# Patient Record
Sex: FEMALE | Race: WHITE | NOT HISPANIC OR LATINO | Employment: OTHER | URBAN - METROPOLITAN AREA
[De-identification: names, ages, dates, MRNs, and addresses within clinical notes are randomized per-mention and may not be internally consistent; named-entity substitution may affect disease eponyms.]

---

## 2017-04-20 ENCOUNTER — TRANSCRIBE ORDERS (OUTPATIENT)
Dept: ADMINISTRATIVE | Facility: HOSPITAL | Age: 62
End: 2017-04-20

## 2017-04-20 DIAGNOSIS — Z12.31 VISIT FOR SCREENING MAMMOGRAM: Primary | ICD-10-CM

## 2017-04-26 ENCOUNTER — TRANSCRIBE ORDERS (OUTPATIENT)
Dept: ADMINISTRATIVE | Facility: HOSPITAL | Age: 62
End: 2017-04-26

## 2017-04-26 DIAGNOSIS — Z87.891 PERSONAL HISTORY OF TOBACCO USE, PRESENTING HAZARDS TO HEALTH: Primary | ICD-10-CM

## 2017-05-01 ENCOUNTER — HOSPITAL ENCOUNTER (OUTPATIENT)
Dept: CT IMAGING | Facility: HOSPITAL | Age: 62
Discharge: HOME | End: 2017-05-01
Admitting: NURSE PRACTITIONER

## 2017-05-01 DIAGNOSIS — Z87.891 PERSONAL HISTORY OF TOBACCO USE, PRESENTING HAZARDS TO HEALTH: ICD-10-CM

## 2017-05-01 PROCEDURE — G0297 LDCT FOR LUNG CA SCREEN: HCPCS

## 2017-05-17 ENCOUNTER — HOSPITAL ENCOUNTER (OUTPATIENT)
Dept: MAMMOGRAPHY | Facility: HOSPITAL | Age: 62
Discharge: HOME | End: 2017-05-17
Attending: FAMILY MEDICINE | Admitting: FAMILY MEDICINE

## 2017-05-17 DIAGNOSIS — Z12.31 VISIT FOR SCREENING MAMMOGRAM: ICD-10-CM

## 2017-05-17 PROCEDURE — G0202 SCR MAMMO BI INCL CAD: HCPCS | Performed by: RADIOLOGY

## 2017-05-17 PROCEDURE — 77063 BREAST TOMOSYNTHESIS BI: CPT | Performed by: RADIOLOGY

## 2017-05-17 PROCEDURE — 77063 BREAST TOMOSYNTHESIS BI: CPT

## 2017-05-17 PROCEDURE — G0202 SCR MAMMO BI INCL CAD: HCPCS

## 2017-05-25 ENCOUNTER — APPOINTMENT (OUTPATIENT)
Dept: MAMMOGRAPHY | Facility: HOSPITAL | Age: 62
End: 2017-05-25

## 2017-05-30 ENCOUNTER — HOSPITAL ENCOUNTER (OUTPATIENT)
Dept: MAMMOGRAPHY | Facility: HOSPITAL | Age: 62
Discharge: HOME | End: 2017-05-30
Admitting: FAMILY MEDICINE

## 2017-05-30 DIAGNOSIS — R92.8 ABNORMAL MAMMOGRAM: ICD-10-CM

## 2017-05-30 PROCEDURE — G0279 TOMOSYNTHESIS, MAMMO: HCPCS

## 2017-05-30 PROCEDURE — G0206 DX MAMMO INCL CAD UNI: HCPCS

## 2017-05-30 PROCEDURE — G0206 DX MAMMO INCL CAD UNI: HCPCS | Performed by: RADIOLOGY

## 2017-05-30 PROCEDURE — G0279 TOMOSYNTHESIS, MAMMO: HCPCS | Performed by: RADIOLOGY

## 2018-04-24 ENCOUNTER — TRANSCRIBE ORDERS (OUTPATIENT)
Dept: ADMINISTRATIVE | Facility: HOSPITAL | Age: 63
End: 2018-04-24

## 2018-04-24 DIAGNOSIS — Z12.31 VISIT FOR SCREENING MAMMOGRAM: Primary | ICD-10-CM

## 2018-06-06 ENCOUNTER — HOSPITAL ENCOUNTER (OUTPATIENT)
Dept: MAMMOGRAPHY | Facility: HOSPITAL | Age: 63
Discharge: HOME | End: 2018-06-06
Attending: FAMILY MEDICINE | Admitting: FAMILY MEDICINE

## 2018-06-06 DIAGNOSIS — Z12.31 VISIT FOR SCREENING MAMMOGRAM: ICD-10-CM

## 2018-06-06 PROCEDURE — 77067 SCR MAMMO BI INCL CAD: CPT | Performed by: RADIOLOGY

## 2018-06-06 PROCEDURE — 77067 SCR MAMMO BI INCL CAD: CPT

## 2018-06-06 PROCEDURE — 77063 BREAST TOMOSYNTHESIS BI: CPT | Performed by: RADIOLOGY

## 2018-06-06 PROCEDURE — 77063 BREAST TOMOSYNTHESIS BI: CPT

## 2019-05-02 ENCOUNTER — TRANSCRIBE ORDERS (OUTPATIENT)
Dept: ADMINISTRATIVE | Facility: HOSPITAL | Age: 64
End: 2019-05-02

## 2019-05-02 DIAGNOSIS — Z87.891 PERSONAL HISTORY OF TOBACCO USE, PRESENTING HAZARDS TO HEALTH: Primary | ICD-10-CM

## 2019-05-13 ENCOUNTER — HOSPITAL ENCOUNTER (OUTPATIENT)
Dept: CT IMAGING | Facility: HOSPITAL | Age: 64
Discharge: HOME | End: 2019-05-13
Admitting: NURSE PRACTITIONER

## 2019-05-13 DIAGNOSIS — Z87.891 PERSONAL HISTORY OF TOBACCO USE, PRESENTING HAZARDS TO HEALTH: ICD-10-CM

## 2019-05-13 PROCEDURE — G0297 LDCT FOR LUNG CA SCREEN: HCPCS

## 2019-05-30 ENCOUNTER — TRANSCRIBE ORDERS (OUTPATIENT)
Dept: ADMINISTRATIVE | Facility: HOSPITAL | Age: 64
End: 2019-05-30

## 2019-05-30 DIAGNOSIS — Z12.31 VISIT FOR SCREENING MAMMOGRAM: Primary | ICD-10-CM

## 2019-07-08 ENCOUNTER — APPOINTMENT (OUTPATIENT)
Dept: MAMMOGRAPHY | Facility: HOSPITAL | Age: 64
End: 2019-07-08

## 2019-08-15 ENCOUNTER — HOSPITAL ENCOUNTER (OUTPATIENT)
Dept: MAMMOGRAPHY | Facility: HOSPITAL | Age: 64
Discharge: HOME | End: 2019-08-15
Admitting: FAMILY MEDICINE

## 2019-08-15 DIAGNOSIS — Z12.31 VISIT FOR SCREENING MAMMOGRAM: ICD-10-CM

## 2019-08-15 PROCEDURE — 77067 SCR MAMMO BI INCL CAD: CPT

## 2019-08-15 PROCEDURE — 77067 SCR MAMMO BI INCL CAD: CPT | Performed by: RADIOLOGY

## 2019-08-15 PROCEDURE — 77063 BREAST TOMOSYNTHESIS BI: CPT | Performed by: RADIOLOGY

## 2019-08-15 PROCEDURE — 77063 BREAST TOMOSYNTHESIS BI: CPT

## 2020-08-14 ENCOUNTER — TRANSCRIBE ORDERS (OUTPATIENT)
Dept: ADMINISTRATIVE | Facility: HOSPITAL | Age: 65
End: 2020-08-14

## 2020-08-14 DIAGNOSIS — Z12.31 VISIT FOR SCREENING MAMMOGRAM: Primary | ICD-10-CM

## 2020-10-10 ENCOUNTER — HOSPITAL ENCOUNTER (OUTPATIENT)
Dept: MAMMOGRAPHY | Facility: HOSPITAL | Age: 65
Discharge: HOME | End: 2020-10-10
Admitting: FAMILY MEDICINE

## 2020-10-10 DIAGNOSIS — Z12.31 VISIT FOR SCREENING MAMMOGRAM: ICD-10-CM

## 2020-10-10 PROCEDURE — 77067 SCR MAMMO BI INCL CAD: CPT | Performed by: RADIOLOGY

## 2020-10-10 PROCEDURE — 77063 BREAST TOMOSYNTHESIS BI: CPT | Performed by: RADIOLOGY

## 2020-10-10 PROCEDURE — 77063 BREAST TOMOSYNTHESIS BI: CPT

## 2020-10-10 PROCEDURE — 77067 SCR MAMMO BI INCL CAD: CPT

## 2021-02-02 ENCOUNTER — TRANSCRIBE ORDERS (OUTPATIENT)
Dept: ADMINISTRATIVE | Facility: HOSPITAL | Age: 66
End: 2021-02-02

## 2021-02-02 DIAGNOSIS — Z87.891 PERSONAL HISTORY OF TOBACCO USE, PRESENTING HAZARDS TO HEALTH: Primary | ICD-10-CM

## 2021-02-18 ENCOUNTER — APPOINTMENT (OUTPATIENT)
Dept: CT IMAGING | Facility: HOSPITAL | Age: 66
End: 2021-02-18

## 2021-02-25 ENCOUNTER — HOSPITAL ENCOUNTER (OUTPATIENT)
Dept: CT IMAGING | Facility: HOSPITAL | Age: 66
Discharge: HOME | End: 2021-02-25
Admitting: NURSE PRACTITIONER

## 2021-02-25 DIAGNOSIS — Z87.891 PERSONAL HISTORY OF TOBACCO USE, PRESENTING HAZARDS TO HEALTH: ICD-10-CM

## 2021-02-25 PROCEDURE — 71271 CT THORAX LUNG CANCER SCR C-: CPT

## 2021-04-13 ENCOUNTER — OFFICE VISIT (OUTPATIENT)
Dept: PULMONOLOGY | Facility: CLINIC | Age: 66
End: 2021-04-13

## 2021-04-13 VITALS
SYSTOLIC BLOOD PRESSURE: 136 MMHG | HEART RATE: 74 BPM | TEMPERATURE: 98.2 F | OXYGEN SATURATION: 92 % | WEIGHT: 130.4 LBS | DIASTOLIC BLOOD PRESSURE: 84 MMHG | BODY MASS INDEX: 23.11 KG/M2 | HEIGHT: 63 IN

## 2021-04-13 DIAGNOSIS — Z72.0 TOBACCO ABUSE: ICD-10-CM

## 2021-04-13 DIAGNOSIS — Z79.01 CHRONIC ANTICOAGULATION: ICD-10-CM

## 2021-04-13 DIAGNOSIS — R91.8 MULTIPLE PULMONARY NODULES: Primary | ICD-10-CM

## 2021-04-13 DIAGNOSIS — I05.9 MITRAL VALVE DISEASE: ICD-10-CM

## 2021-04-13 PROCEDURE — 99204 OFFICE O/P NEW MOD 45 MIN: CPT | Performed by: INTERNAL MEDICINE

## 2021-04-13 RX ORDER — NIFEDIPINE 30 MG/1
TABLET, EXTENDED RELEASE ORAL DAILY
COMMUNITY

## 2021-04-13 RX ORDER — ASPIRIN 81 MG/1
TABLET ORAL DAILY
COMMUNITY

## 2021-04-13 RX ORDER — RALOXIFENE HYDROCHLORIDE 60 MG/1
60 TABLET, FILM COATED ORAL DAILY
COMMUNITY

## 2021-04-13 RX ORDER — AMITRIPTYLINE HYDROCHLORIDE 25 MG/1
TABLET, FILM COATED ORAL DAILY
COMMUNITY
Start: 2013-05-07 | End: 2021-04-13

## 2021-04-13 RX ORDER — DICLOFENAC SODIUM 75 MG/1
TABLET, DELAYED RELEASE ORAL
COMMUNITY
Start: 2013-05-07

## 2021-04-13 RX ORDER — ALPRAZOLAM 0.5 MG/1
TABLET ORAL
COMMUNITY
Start: 2021-01-14

## 2021-04-13 RX ORDER — TRAZODONE HYDROCHLORIDE 100 MG/1
TABLET ORAL
COMMUNITY
Start: 2021-01-14

## 2021-04-13 RX ORDER — CLOPIDOGREL BISULFATE 75 MG/1
TABLET ORAL DAILY
COMMUNITY
Start: 2013-05-07

## 2021-04-13 RX ORDER — PANTOPRAZOLE SODIUM 40 MG/1
TABLET, DELAYED RELEASE ORAL DAILY
COMMUNITY

## 2021-04-13 RX ORDER — GABAPENTIN 300 MG/1
1 CAPSULE ORAL
COMMUNITY
Start: 2013-05-07

## 2021-04-13 RX ORDER — ATORVASTATIN CALCIUM 40 MG/1
TABLET, FILM COATED ORAL DAILY
COMMUNITY
Start: 2013-05-07

## 2021-04-13 RX ORDER — DULOXETIN HYDROCHLORIDE 60 MG/1
CAPSULE, DELAYED RELEASE ORAL DAILY
COMMUNITY
Start: 2013-05-07 | End: 2021-04-13

## 2021-04-13 RX ORDER — LISINOPRIL 40 MG/1
TABLET ORAL DAILY
COMMUNITY
Start: 2021-01-14

## 2021-04-13 RX ORDER — TRAMADOL HYDROCHLORIDE 50 MG/1
TABLET ORAL 3 TIMES DAILY
COMMUNITY
Start: 2013-05-07

## 2021-04-13 RX ORDER — METOPROLOL SUCCINATE 25 MG/1
TABLET, EXTENDED RELEASE ORAL DAILY
COMMUNITY
Start: 2013-05-07

## 2021-04-13 NOTE — PROGRESS NOTES
PULMONARY  NOTE    Chief Complaint     Abnormal CT scan of the chest, former smoker, valvular heart disease, chronic anticoagulation, chronic fluid retention    History of Present Illness     65-year-old female referred for evaluation of an abnormal CT scan of the chest    She has a history of tobacco abuse that has consisted of up to 1 pack of cigarettes per day  On one hand she says she stopped smoking in 2013  However, she indicates that she last smoked a cigarette about 4 months ago  She has been using nicotine vaping systems to help her stop smoking    She denies prior history of known lung disease  She does not cough on a regular basis and has never had hemoptysis  She does have some dyspnea on exertion but does not feel that it limits her on a daily basis  She has tried what sounds to have been albuterol in the past that did not seem to help    She has chronic lower extremity edema  She has been prescribed furosemide and potassium to take as needed for lower extremity edema  She admittedly is not good at taking her diuretics on a regular basis    She is undergone screening LDCT with the most recent LDCT as noted below hence her referral to our office    Patient Active Problem List   Diagnosis   • Tobacco abuse (Last cig ~12/2020)   • Bilateral solid and semisolid nodules (New RLL 6mm pleural based)   • Chronic anticoagulation (Warfarin)   • Mitral valve disease (S/P MVR)     Allergies   Allergen Reactions   • Vitamin K Confusion       Current Outpatient Medications:   •  ALPRAZolam (XANAX) 0.5 MG tablet, Take  by mouth., Disp: , Rfl:   •  aspirin (ASPIR) 81 MG EC tablet, Take  by mouth Daily., Disp: , Rfl:   •  atorvastatin (LIPITOR) 40 MG tablet, Take  by mouth Daily., Disp: , Rfl:   •  clopidogrel (Plavix) 75 MG tablet, Take  by mouth Daily., Disp: , Rfl:   •  diclofenac (VOLTAREN) 75 MG EC tablet, Take  by mouth., Disp: , Rfl:   •  gabapentin (NEURONTIN) 300 MG capsule, Take 1 capsule by mouth., Disp: ,  "Rfl:   •  lisinopril (PRINIVIL,ZESTRIL) 40 MG tablet, Take  by mouth Daily., Disp: , Rfl:   •  metoprolol succinate XL (TOPROL-XL) 25 MG 24 hr tablet, Take  by mouth Daily., Disp: , Rfl:   •  NIFEdipine XL (Procardia XL) 30 MG 24 hr tablet, Take  by mouth Daily., Disp: , Rfl:   •  pantoprazole (PROTONIX) 40 MG EC tablet, Take  by mouth Daily., Disp: , Rfl:   •  raloxifene (Evista) 60 MG tablet, Take 60 mg by mouth Daily., Disp: , Rfl:   •  traMADol (ULTRAM) 50 MG tablet, Take  by mouth 3 (Three) Times a Day., Disp: , Rfl:   •  traZODone (DESYREL) 100 MG tablet, Take  by mouth., Disp: , Rfl:   MEDICATION LIST AND ALLERGIES REVIEWED.    Family History   Problem Relation Age of Onset   • Breast cancer Mother 73   • Ovarian cancer Neg Hx      Social History     Tobacco Use   • Smoking status: Former Smoker     Packs/day: 1.00     Years: 35.00     Pack years: 35.00   • Smokeless tobacco: Never Used   • Tobacco comment: vapes   Substance Use Topics   • Alcohol use: Never   • Drug use: Never     Social History     Social History Narrative    Single    Retired    Last cigarette was about 4 months ago.    Has been using vaping to stop smoking     FAMILY AND SOCIAL HISTORY REVIEWED.    Review of Systems  ALSO REFER TO SCANNED ROS SHEET FROM SAME DATE.    /84   Pulse 74   Temp 98.2 °F (36.8 °C)   Ht 160 cm (63\")   Wt 59.1 kg (130 lb 6.4 oz)   LMP  (LMP Unknown)   SpO2 92% Comment: room  air  at rest  BMI 23.10 kg/m²   Physical Exam  Vitals and nursing note reviewed.   Constitutional:       General: She is not in acute distress.     Appearance: She is well-developed. She is not diaphoretic.   HENT:      Head: Normocephalic and atraumatic.   Neck:      Thyroid: No thyromegaly.   Cardiovascular:      Rate and Rhythm: Normal rate and regular rhythm.      Heart sounds: Normal heart sounds. No murmur heard.     Pulmonary:      Effort: Pulmonary effort is normal.      Breath sounds: Normal breath sounds. No stridor. "   Abdominal:      General: Bowel sounds are normal.      Palpations: Abdomen is soft.   Musculoskeletal:         General: Normal range of motion.      Right lower leg: No edema.      Left lower leg: No edema.   Lymphadenopathy:      Cervical: No cervical adenopathy.      Upper Body:      Right upper body: No supraclavicular or epitrochlear adenopathy.      Left upper body: No supraclavicular or epitrochlear adenopathy.   Skin:     General: Skin is warm and dry.   Neurological:      Mental Status: She is alert and oriented to person, place, and time.   Psychiatric:         Behavior: Behavior normal.         Results     CT scan of the chest done 2/25/2021 reviewed on PACS.  This is an LDCT  Bilateral subcentimeter pulmonary nodules both solid and semisolid  New right lower lobe pleural-based solid approximately 6 mm density    Immunization History   Administered Date(s) Administered   • COVID-19 (MODERNA) 02/23/2021, 03/24/2021   • Flu Vaccine Quad PF >36MO 10/09/2017, 10/24/2018, 10/09/2019, 10/13/2020   • Influenza Quad Vaccine (Inpatient) 11/16/2016     Problem List       ICD-10-CM ICD-9-CM   1. Bilateral solid and semisolid nodules (New RLL 6mm pleural based)  R91.8 793.19   2. Tobacco abuse (Last cig ~12/2020)  Z72.0 305.1   3. Chronic anticoagulation (Warfarin)  Z79.01 V58.61   4. Mitral valve disease (S/P MVR)  I05.9 394.9       Discussion     We reviewed her chest imaging  She has a new, solid, 6 mm pleural-based pulmonary nodule  This would be a lung RADS category 4A  I have recommended a 3-month CT scan of the chest and if stable would go to a 6-month CT scan of the chest after that.  This lesion is not large enough for PET scanning    I have encouraged her efforts at total smoking abstinence    Might consider PFTs in the future although at this point I do not think she is interested in being on any respiratory medicines for dyspnea    I have encouraged salt water avoidance and regular use of  diuretic    Moderate level of Medical Decision Making complexity based on 1 undiagnosed new problem and moderate amount and/or complexity of data review/analysis    Rodrick Figueroa MD  Note electronically signed    CC: Pedro Aguilera MD

## 2021-04-15 DIAGNOSIS — R91.8 MULTIPLE PULMONARY NODULES: Primary | ICD-10-CM

## 2021-06-17 ENCOUNTER — APPOINTMENT (OUTPATIENT)
Dept: CT IMAGING | Facility: HOSPITAL | Age: 66
End: 2021-06-17

## 2021-06-28 ENCOUNTER — HOSPITAL ENCOUNTER (OUTPATIENT)
Dept: CT IMAGING | Facility: HOSPITAL | Age: 66
Discharge: HOME | End: 2021-06-28
Admitting: NURSE PRACTITIONER

## 2021-06-28 DIAGNOSIS — R91.8 MULTIPLE PULMONARY NODULES: ICD-10-CM

## 2021-06-28 PROCEDURE — 71250 CT THORAX DX C-: CPT

## 2021-07-01 ENCOUNTER — OFFICE VISIT (OUTPATIENT)
Dept: PULMONOLOGY | Facility: CLINIC | Age: 66
End: 2021-07-01

## 2021-07-01 VITALS
OXYGEN SATURATION: 98 % | TEMPERATURE: 97.5 F | BODY MASS INDEX: 23.5 KG/M2 | DIASTOLIC BLOOD PRESSURE: 90 MMHG | WEIGHT: 132.6 LBS | SYSTOLIC BLOOD PRESSURE: 130 MMHG | HEART RATE: 76 BPM | HEIGHT: 63 IN

## 2021-07-01 DIAGNOSIS — Z72.0 TOBACCO ABUSE: ICD-10-CM

## 2021-07-01 DIAGNOSIS — R91.8 MULTIPLE PULMONARY NODULES: Primary | ICD-10-CM

## 2021-07-01 DIAGNOSIS — I05.9 MITRAL VALVE DISEASE: ICD-10-CM

## 2021-07-01 DIAGNOSIS — Z79.01 CHRONIC ANTICOAGULATION: ICD-10-CM

## 2021-07-01 PROCEDURE — 99214 OFFICE O/P EST MOD 30 MIN: CPT | Performed by: INTERNAL MEDICINE

## 2021-07-01 RX ORDER — WARFARIN SODIUM 5 MG/1
5 TABLET ORAL
COMMUNITY

## 2021-07-02 DIAGNOSIS — F17.210 CIGARETTE NICOTINE DEPENDENCE, UNCOMPLICATED: Primary | ICD-10-CM

## 2021-07-02 NOTE — PROGRESS NOTES
PULMONARY  NOTE    Chief Complaint     Pulmonary nodules, former smoker, valvular heart disease, chronic anticoagulation, chronic fluid retention    History of Present Illness     65-year-old female returns today for follow-up  I initially saw her 4/13/2021    She has a history of tobacco abuse that resolved in roughly December 2020  She has not been on regular maintenance therapy for COPD but has used albuterol in the past.    She has had chest imaging which on her prior scan revealed a new 6 mm right lower lobe pulmonary nodule.  Repeat CT scan of the chest is as noted below    She has a history of valvular heart disease and is chronically anticoagulated on warfarin  She has previously undergone MVR  She has had persistent issues with lower extremity edema and is currently on Bumex with no exacerbation of swelling    Patient Active Problem List   Diagnosis   • Tobacco abuse (Last cig ~12/2020)   • Bilateral solid and semisolid nodules   • Chronic anticoagulation (Warfarin)   • Mitral valve disease (S/P MVR)     Allergies   Allergen Reactions   • Vitamin K Confusion       Current Outpatient Medications:   •  ALPRAZolam (XANAX) 0.5 MG tablet, Take  by mouth., Disp: , Rfl:   •  aspirin (ASPIR) 81 MG EC tablet, Take  by mouth Daily., Disp: , Rfl:   •  atorvastatin (LIPITOR) 40 MG tablet, Take  by mouth Daily., Disp: , Rfl:   •  diclofenac (VOLTAREN) 75 MG EC tablet, Take  by mouth., Disp: , Rfl:   •  gabapentin (NEURONTIN) 300 MG capsule, Take 1 capsule by mouth., Disp: , Rfl:   •  lisinopril (PRINIVIL,ZESTRIL) 40 MG tablet, Take  by mouth Daily., Disp: , Rfl:   •  metoprolol succinate XL (TOPROL-XL) 25 MG 24 hr tablet, Take  by mouth Daily., Disp: , Rfl:   •  NIFEdipine XL (Procardia XL) 30 MG 24 hr tablet, Take  by mouth Daily., Disp: , Rfl:   •  pantoprazole (PROTONIX) 40 MG EC tablet, Take  by mouth Daily., Disp: , Rfl:   •  raloxifene (Evista) 60 MG tablet, Take 60 mg by mouth Daily., Disp: , Rfl:   •  traMADol  "(ULTRAM) 50 MG tablet, Take  by mouth 3 (Three) Times a Day., Disp: , Rfl:   •  traZODone (DESYREL) 100 MG tablet, Take  by mouth., Disp: , Rfl:   •  warfarin (COUMADIN) 5 MG tablet, Take 5 mg by mouth Daily., Disp: , Rfl:   •  clopidogrel (Plavix) 75 MG tablet, Take  by mouth Daily., Disp: , Rfl:   MEDICATION LIST AND ALLERGIES REVIEWED.    Family History   Problem Relation Age of Onset   • Breast cancer Mother 73   • Ovarian cancer Neg Hx      Social History     Tobacco Use   • Smoking status: Current Every Day Smoker     Packs/day: 1.00     Years: 35.00     Pack years: 35.00   • Smokeless tobacco: Never Used   • Tobacco comment: currently vapes as of 7/1/21   Substance Use Topics   • Alcohol use: Never   • Drug use: Never     Social History     Social History Narrative    Single    Retired    Last cigarette was about 4 months ago.    Has been using vaping to stop smoking     FAMILY AND SOCIAL HISTORY REVIEWED.    Review of Systems  ALSO REFER TO SCANNED ROS SHEET FROM SAME DATE.    /90   Pulse 76   Temp 97.5 °F (36.4 °C)   Ht 160 cm (63\")   Wt 60.1 kg (132 lb 9.6 oz)   LMP  (LMP Unknown)   SpO2 98% Comment: resting, room air  Breastfeeding No   BMI 23.49 kg/m²   Physical Exam  Vitals and nursing note reviewed.   Constitutional:       General: She is not in acute distress.     Appearance: She is well-developed. She is not diaphoretic.   HENT:      Head: Normocephalic and atraumatic.   Neck:      Thyroid: No thyromegaly.   Cardiovascular:      Rate and Rhythm: Normal rate and regular rhythm.      Heart sounds: Normal heart sounds. No murmur heard.     Pulmonary:      Effort: Pulmonary effort is normal.      Breath sounds: Normal breath sounds. No stridor.   Abdominal:      General: Bowel sounds are normal.      Palpations: Abdomen is soft.   Musculoskeletal:         General: Normal range of motion.   Lymphadenopathy:      Cervical: No cervical adenopathy.      Upper Body:      Right upper body: No " supraclavicular or epitrochlear adenopathy.      Left upper body: No supraclavicular or epitrochlear adenopathy.   Skin:     General: Skin is warm and dry.   Neurological:      Mental Status: She is alert and oriented to person, place, and time.   Psychiatric:         Behavior: Behavior normal.         Results     CT scan of the chest from 6/28/2021 reviewed on PACS.  Resolution of the previously noted 6 mm right lower lobe pulmonary nodule  Stability of other intrathoracic findings including her ascending aortic aneurysm which is about 4.6 cm in size  Immunization History   Administered Date(s) Administered   • COVID-19 (MODERNA) 02/23/2021, 03/24/2021   • Flu Vaccine Quad PF >36MO 10/09/2017, 10/24/2018, 10/09/2019, 10/13/2020   • Influenza Quad Vaccine (Inpatient) 11/16/2016     Problem List       ICD-10-CM ICD-9-CM   1. Bilateral solid and semisolid nodules (RLL 6mm pleural based)  R91.8 793.19   2. Chronic anticoagulation (Warfarin)  Z79.01 V58.61   3. Mitral valve disease (S/P MVR)  I05.9 394.9   4. Tobacco abuse (Last cig ~12/2020)  Z72.0 305.1       Discussion     We discussed her CT scan results.  At this point I would just recommend a repeat LD CT in 1 year in June 2022    She is can remain on Bumex with potassium for her lower extremity edema    She has not been on maintenance COPD therapy but has had albuterol in the past to use on an as-needed basis    I will plan to see her back next year or earlier if there are any problems in the meantime    I have encouraged her smoking cessation efforts    Moderate level of Medical Decision Making complexity based on 2 or more chronic stable illnesses and prescription drug management.    Rodrick Figueroa MD  Note electronically signed    CC: Pedro Aguilera MD

## 2022-01-18 ENCOUNTER — TRANSCRIBE ORDERS (OUTPATIENT)
Dept: ADMINISTRATIVE | Facility: HOSPITAL | Age: 67
End: 2022-01-18

## 2022-01-18 DIAGNOSIS — Z12.31 VISIT FOR SCREENING MAMMOGRAM: Primary | ICD-10-CM

## 2022-02-16 ENCOUNTER — HOSPITAL ENCOUNTER (OUTPATIENT)
Dept: MAMMOGRAPHY | Facility: HOSPITAL | Age: 67
Discharge: HOME | End: 2022-02-16
Admitting: FAMILY MEDICINE

## 2022-02-16 DIAGNOSIS — Z12.31 VISIT FOR SCREENING MAMMOGRAM: ICD-10-CM

## 2022-02-16 PROCEDURE — 77063 BREAST TOMOSYNTHESIS BI: CPT

## 2022-02-16 PROCEDURE — 77067 SCR MAMMO BI INCL CAD: CPT | Performed by: RADIOLOGY

## 2022-02-16 PROCEDURE — 77067 SCR MAMMO BI INCL CAD: CPT

## 2022-02-16 PROCEDURE — 77063 BREAST TOMOSYNTHESIS BI: CPT | Performed by: RADIOLOGY

## 2022-06-30 ENCOUNTER — HOSPITAL ENCOUNTER (OUTPATIENT)
Dept: CT IMAGING | Facility: HOSPITAL | Age: 67
Discharge: HOME | End: 2022-06-30
Admitting: NURSE PRACTITIONER

## 2022-06-30 DIAGNOSIS — F17.210 CIGARETTE NICOTINE DEPENDENCE, UNCOMPLICATED: ICD-10-CM

## 2022-06-30 PROCEDURE — 71271 CT THORAX LUNG CANCER SCR C-: CPT

## 2022-07-01 ENCOUNTER — OFFICE VISIT (OUTPATIENT)
Dept: PULMONOLOGY | Facility: CLINIC | Age: 67
End: 2022-07-01

## 2022-07-01 VITALS
DIASTOLIC BLOOD PRESSURE: 66 MMHG | BODY MASS INDEX: 23.92 KG/M2 | TEMPERATURE: 98.2 F | WEIGHT: 135 LBS | HEIGHT: 63 IN | HEART RATE: 62 BPM | OXYGEN SATURATION: 98 % | SYSTOLIC BLOOD PRESSURE: 122 MMHG

## 2022-07-01 DIAGNOSIS — Z72.0 TOBACCO ABUSE: ICD-10-CM

## 2022-07-01 DIAGNOSIS — Z79.01 CHRONIC ANTICOAGULATION: ICD-10-CM

## 2022-07-01 DIAGNOSIS — I05.9 MITRAL VALVE DISEASE: ICD-10-CM

## 2022-07-01 DIAGNOSIS — R91.8 MULTIPLE PULMONARY NODULES: Primary | ICD-10-CM

## 2022-07-01 PROCEDURE — 99214 OFFICE O/P EST MOD 30 MIN: CPT | Performed by: INTERNAL MEDICINE

## 2022-07-01 NOTE — PROGRESS NOTES
PULMONARY  NOTE    Chief Complaint     Pulmonary nodules, former smoker, valvular heart disease, chronic anticoagulation    History of Present Illness     66-year-old female returns today for follow-up  I last saw her 7/1/2021    Has a history of tobacco abuse, resolved in 2020  She remains off cigarettes although is using a Juul nicotine vaping system    She has had abnormal chest imaging with lower lobe pulmonary nodules  We have been pursuing serial chest imaging  Most recent CT scan of the chest is as noted below    She has a history of valvular heart disease with a prior MVR  She remains chronically anticoagulated on warfarin  She is had no bleeding issues    She has had problems in the past with persistent lower extremity edema although appears somewhat improved currently    Patient Active Problem List   Diagnosis   • Tobacco abuse (Last cig ~12/2020)   • Bilateral solid and semisolid nodules   • Chronic anticoagulation (Warfarin)   • Mitral valve disease (S/P MVR)     Allergies   Allergen Reactions   • Vitamin K Confusion       Current Outpatient Medications:   •  ALPRAZolam (XANAX) 0.5 MG tablet, Take  by mouth., Disp: , Rfl:   •  aspirin (aspirin) 81 MG EC tablet, Take  by mouth Daily., Disp: , Rfl:   •  atorvastatin (LIPITOR) 40 MG tablet, Take  by mouth Daily., Disp: , Rfl:   •  clopidogrel (PLAVIX) 75 MG tablet, Take  by mouth Daily., Disp: , Rfl:   •  diclofenac (VOLTAREN) 75 MG EC tablet, Take  by mouth., Disp: , Rfl:   •  gabapentin (NEURONTIN) 300 MG capsule, Take 1 capsule by mouth., Disp: , Rfl:   •  lisinopril (PRINIVIL,ZESTRIL) 40 MG tablet, Take  by mouth Daily., Disp: , Rfl:   •  metoprolol succinate XL (TOPROL-XL) 25 MG 24 hr tablet, Take  by mouth Daily., Disp: , Rfl:   •  NIFEdipine XL (PROCARDIA XL) 30 MG 24 hr tablet, Take  by mouth Daily., Disp: , Rfl:   •  pantoprazole (PROTONIX) 40 MG EC tablet, Take  by mouth Daily., Disp: , Rfl:   •  raloxifene (EVISTA) 60 MG tablet, Take 60 mg by  "mouth Daily., Disp: , Rfl:   •  traMADol (ULTRAM) 50 MG tablet, Take  by mouth 3 (Three) Times a Day., Disp: , Rfl:   •  traZODone (DESYREL) 100 MG tablet, Take  by mouth., Disp: , Rfl:   •  warfarin (COUMADIN) 5 MG tablet, Take 5 mg by mouth Daily., Disp: , Rfl:   MEDICATION LIST AND ALLERGIES REVIEWED.    Family History   Problem Relation Age of Onset   • Breast cancer Mother 73   • Ovarian cancer Neg Hx      Social History     Tobacco Use   • Smoking status: Current Every Day Smoker     Packs/day: 1.00     Years: 35.00     Pack years: 35.00   • Smokeless tobacco: Never Used   • Tobacco comment: currently vapes as of 7/1/21   Substance Use Topics   • Alcohol use: Never   • Drug use: Never     Social History     Social History Narrative    Single    Retired    Last cigarette was about 4 months ago.    Has been using vaping to stop smoking     FAMILY AND SOCIAL HISTORY REVIEWED.    Review of Systems  ALSO REFER TO SCANNED ROS SHEET FROM SAME DATE.    /66 (BP Location: Right arm, Patient Position: Sitting)   Pulse 62   Temp 98.2 °F (36.8 °C) (Infrared)   Ht 160 cm (62.99\")   Wt 61.2 kg (135 lb)   LMP  (LMP Unknown)   SpO2 98% Comment: room air, at rest  BMI 23.92 kg/m²   Physical Exam  Vitals and nursing note reviewed.   Constitutional:       General: She is not in acute distress.     Appearance: She is well-developed. She is not diaphoretic.   HENT:      Head: Normocephalic and atraumatic.   Neck:      Thyroid: No thyromegaly.   Cardiovascular:      Rate and Rhythm: Normal rate and regular rhythm.      Heart sounds: Normal heart sounds. No murmur heard.  Pulmonary:      Effort: Pulmonary effort is normal.      Breath sounds: Normal breath sounds. No stridor.   Chest:   Breasts:      Right: No supraclavicular adenopathy.      Left: No supraclavicular adenopathy.       Lymphadenopathy:      Cervical: No cervical adenopathy.      Upper Body:      Right upper body: No supraclavicular or epitrochlear " adenopathy.      Left upper body: No supraclavicular or epitrochlear adenopathy.   Skin:     General: Skin is warm and dry.   Neurological:      Mental Status: She is alert and oriented to person, place, and time.   Psychiatric:         Behavior: Behavior normal.         Results     I reviewed his CT scan from 6/30/2022  The irregular conglomerate of nodules appear stable  There is a very small 3-4 mm pulmonary nodule in the left base that I do not clearly see on the prior 2 scans but going back to 2017 there appears to have been an ill-defined nodule in that area    Immunization History   Administered Date(s) Administered   • COVID-19 (MODERNA) 1st, 2nd, 3rd Dose Only 02/23/2021, 03/24/2021   • Flu Vaccine Quad PF >36MO 10/09/2017, 10/24/2018, 10/09/2019, 10/13/2020   • Influenza Quad Vaccine (Inpatient) 11/16/2016     Problem List       ICD-10-CM ICD-9-CM   1. Bilateral solid and semisolid nodules  R91.8 793.19   2. Chronic anticoagulation (Warfarin)  Z79.01 V58.61   3. Mitral valve disease (S/P MVR)  I05.9 394.9   4. Tobacco abuse (Last cig ~12/2020)  Z72.0 305.1       Discussion     We reviewed her chest imaging  Nothing particularly worrisome or suspicious  There is a very small left lower lobe pulmonary nodule that I think may have been present back in 2017  At this point, we will just get a 1 year LDCT    I have an encouraged her efforts at smoking cessation  We talked about vaping and potential side effects, particularly with some of the vaping systems    She remains anticoagulated with no bleeding issues    I will plan to see her back in a year or earlier if there are any problems in the meantime    Moderate level of Medical Decision Making complexity based on 2 or more chronic stable illnesses and prescription drug management.    Rodrick Figueroa MD  Note electronically signed    CC: Pedro Aguilera MD

## 2022-07-07 DIAGNOSIS — F17.210 CIGARETTE NICOTINE DEPENDENCE WITHOUT COMPLICATION: Primary | ICD-10-CM

## 2023-04-10 ENCOUNTER — TRANSCRIBE ORDERS (OUTPATIENT)
Dept: ADMINISTRATIVE | Facility: HOSPITAL | Age: 68
End: 2023-04-10
Payer: MEDICARE

## 2023-04-10 DIAGNOSIS — Z12.31 VISIT FOR SCREENING MAMMOGRAM: Primary | ICD-10-CM

## 2023-05-05 ENCOUNTER — HOSPITAL ENCOUNTER (OUTPATIENT)
Dept: MAMMOGRAPHY | Facility: HOSPITAL | Age: 68
Discharge: HOME | End: 2023-05-05
Admitting: FAMILY MEDICINE
Payer: MEDICARE

## 2023-05-05 DIAGNOSIS — Z12.31 VISIT FOR SCREENING MAMMOGRAM: ICD-10-CM

## 2023-05-05 PROCEDURE — 77067 SCR MAMMO BI INCL CAD: CPT

## 2023-05-05 PROCEDURE — 77063 BREAST TOMOSYNTHESIS BI: CPT

## 2023-08-31 ENCOUNTER — OFFICE VISIT (OUTPATIENT)
Dept: PULMONOLOGY | Facility: CLINIC | Age: 68
End: 2023-08-31
Payer: MEDICARE

## 2023-08-31 VITALS
SYSTOLIC BLOOD PRESSURE: 138 MMHG | WEIGHT: 132.2 LBS | BODY MASS INDEX: 23.42 KG/M2 | RESPIRATION RATE: 16 BRPM | HEART RATE: 72 BPM | TEMPERATURE: 97.5 F | DIASTOLIC BLOOD PRESSURE: 78 MMHG | OXYGEN SATURATION: 93 %

## 2023-08-31 DIAGNOSIS — R91.8 MULTIPLE PULMONARY NODULES: Primary | ICD-10-CM

## 2023-08-31 DIAGNOSIS — Z72.0 TOBACCO ABUSE: ICD-10-CM

## 2023-08-31 DIAGNOSIS — Z79.01 CHRONIC ANTICOAGULATION: ICD-10-CM

## 2023-08-31 DIAGNOSIS — I05.9 MITRAL VALVE DISEASE: ICD-10-CM

## 2023-08-31 RX ORDER — DULOXETIN HYDROCHLORIDE 60 MG/1
60 CAPSULE, DELAYED RELEASE ORAL DAILY
COMMUNITY

## 2023-08-31 RX ORDER — LISINOPRIL 20 MG/1
20 TABLET ORAL DAILY
COMMUNITY

## 2023-08-31 RX ORDER — METOPROLOL SUCCINATE 100 MG/1
100 TABLET, EXTENDED RELEASE ORAL DAILY
COMMUNITY

## 2023-08-31 RX ORDER — MAGNESIUM GLUCONATE 30 MG(550)
30 TABLET ORAL
COMMUNITY

## 2023-08-31 NOTE — PROGRESS NOTES
PULMONARY  NOTE    Chief Complaint     Pulmonary nodules, former smoker, valvular heart disease, chronic anticoagulation, abdominal aortic aneurysm    History of Present Illness     67-year-old female returns today for follow-up  Last saw her about a year ago    She has a history of tobacco abuse, resolved in 2020  She continues to use a Juul nicotine vaping system    She is had no acute exacerbation of respiratory symptoms since I last saw her    She has had pulmonary nodules on prior chest imaging  Most recent LDCT is as noted below    She had a prior MVR and is chronically anticoagulated with warfarin  No cardiac issues or bleeding issues since I saw her    She follows up regularly with Dr. Mike, theology    Patient Active Problem List   Diagnosis    Tobacco abuse (Last cig ~12/2020)    Bilateral solid and semisolid nodules    Chronic anticoagulation (Warfarin)    Mitral valve disease (S/P MVR)      Allergies   Allergen Reactions    Vitamin K Confusion       Current Outpatient Medications:     ALPRAZolam (XANAX) 0.5 MG tablet, Take  by mouth., Disp: , Rfl:     aspirin (aspirin) 81 MG EC tablet, Take  by mouth Daily., Disp: , Rfl:     atorvastatin (LIPITOR) 40 MG tablet, Take  by mouth Daily., Disp: , Rfl:     clopidogrel (PLAVIX) 75 MG tablet, Take  by mouth Daily., Disp: , Rfl:     DULoxetine (CYMBALTA) 60 MG capsule, Take 1 capsule by mouth Daily., Disp: , Rfl:     gabapentin (NEURONTIN) 300 MG capsule, Take 1 capsule by mouth., Disp: , Rfl:     lisinopril (PRINIVIL,ZESTRIL) 20 MG tablet, Take 1 tablet by mouth Daily., Disp: , Rfl:     Magnesium Gluconate 550 MG tablet, Take 30 mg by mouth., Disp: , Rfl:     metoprolol succinate XL (TOPROL-XL) 100 MG 24 hr tablet, Take 1 tablet by mouth Daily., Disp: , Rfl:     NIFEdipine XL (PROCARDIA XL) 30 MG 24 hr tablet, Take  by mouth Daily., Disp: , Rfl:     raloxifene (EVISTA) 60 MG tablet, Take 1 tablet by mouth Daily., Disp: , Rfl:     traMADol (ULTRAM) 50 MG  tablet, Take  by mouth 3 (Three) Times a Day., Disp: , Rfl:     traZODone (DESYREL) 100 MG tablet, Take  by mouth., Disp: , Rfl:     warfarin (COUMADIN) 5 MG tablet, Take 1 tablet by mouth Daily., Disp: , Rfl:     diclofenac (VOLTAREN) 75 MG EC tablet, Take  by mouth., Disp: , Rfl:     pantoprazole (PROTONIX) 40 MG EC tablet, Take  by mouth Daily., Disp: , Rfl:   MEDICATION LIST AND ALLERGIES REVIEWED.    Family History   Problem Relation Age of Onset    Breast cancer Mother 73    Hypertension Mother     Diabetes Brother     Ovarian cancer Neg Hx      Social History     Tobacco Use    Smoking status: Some Days     Types: Electronic Cigarette    Smokeless tobacco: Never    Tobacco comments:     currently vapes as of 7/1/21   Substance Use Topics    Alcohol use: Not Currently    Drug use: Never     Social History     Social History Narrative    Single    Retired    Last cigarette was about 4 months ago.    Has been using vaping to stop smoking     FAMILY AND SOCIAL HISTORY REVIEWED.    Review of Systems  IF PRESENT REFER TO SCANNED ROS SHEET FROM SAME DATE  OTHERWISE ROS OBTAINED AND NON-CONTRIBUTORY OVER HPI.    /78   Pulse 72   Temp 97.5 øF (36.4 øC)   Resp 16   Wt 60 kg (132 lb 3.2 oz)   LMP  (LMP Unknown)   SpO2 93%   BMI 23.42 kg/mý   Physical Exam  Vitals and nursing note reviewed.   Constitutional:       General: She is not in acute distress.     Appearance: She is well-developed. She is not diaphoretic.   HENT:      Head: Normocephalic and atraumatic.   Neck:      Thyroid: No thyromegaly.   Cardiovascular:      Rate and Rhythm: Normal rate and regular rhythm.      Heart sounds: No murmur heard.     Comments: Crisp prosthetic valve sounds  Pulmonary:      Effort: Pulmonary effort is normal.      Breath sounds: Normal breath sounds. No stridor.   Lymphadenopathy:      Cervical: No cervical adenopathy.      Upper Body:      Right upper body: No supraclavicular or epitrochlear adenopathy.      Left  upper body: No supraclavicular or epitrochlear adenopathy.   Skin:     General: Skin is warm and dry.   Neurological:      Mental Status: She is alert and oriented to person, place, and time.   Psychiatric:         Behavior: Behavior normal.       Results     No CT scan of the chest from July 2023 reviewed on PACS  No suspicious intrathoracic lesions  Abdominal aortic aneurysm 4.7 cm noted    Immunization History   Administered Date(s) Administered    COVID-19 (MODERNA) 1st,2nd,3rd Dose Monovalent 02/23/2021, 03/24/2021    Flu Vaccine Quad PF >36MO 10/09/2017, 10/24/2018, 10/09/2019, 10/13/2020    Influenza Quad Vaccine (Inpatient) 11/16/2016     Problem List       ICD-10-CM ICD-9-CM   1. Bilateral solid and semisolid nodules  R91.8 793.19   2. Chronic anticoagulation (Warfarin)  Z79.01 V58.61   3. Mitral valve disease (S/P MVR)  I05.9 394.9   4. Tobacco abuse (Last cig ~12/2020)  Z72.0 305.1       Discussion     We reviewed her chest imaging  No suspicious intrathoracic lesions from a pulmonary perspective  I have recommended a repeat LDCT in 1 year    We discussed her abdominal aortic aneurysm  I have offered to refer her back to Dr. Berry who had previously operated on her  She is following up with Dr. Mike and would just like to discuss it with him first  I gave her a copy of her CT report to take to Dr. Mike    At this point I will just plan to see her back in 1 year or earlier if there are any problems in the meantime    Level of service justified based on 35 minutes spent in patient care on this date of service including, but not limited to: preparing to see the patient, obtaining and/or reviewing history, performing medically appropriate examination, ordering tests/medicine/procedures, independently interpreting results, documenting clinical information in EHR, and counseling/education of patient/family/caregiver (excluding time spent on other separate services such as performing procedures or test  interpretation, if applicable). (Level 4 30-39 minutes; Level 5 40-54 minutes)    Rodrick Figueroa MD  Note electronically signed    CC: Margo Willingham MD

## 2023-09-05 DIAGNOSIS — Z87.891 PERSONAL HISTORY OF NICOTINE DEPENDENCE: Primary | ICD-10-CM

## 2024-05-28 ENCOUNTER — TRANSCRIBE ORDERS (OUTPATIENT)
Dept: ADMINISTRATIVE | Facility: HOSPITAL | Age: 69
End: 2024-05-28
Payer: MEDICARE

## 2024-05-28 DIAGNOSIS — Z12.31 SCREENING MAMMOGRAM FOR BREAST CANCER: Primary | ICD-10-CM

## 2024-06-20 ENCOUNTER — HOSPITAL ENCOUNTER (OUTPATIENT)
Dept: MAMMOGRAPHY | Facility: HOSPITAL | Age: 69
Discharge: HOME | End: 2024-06-20
Admitting: INTERNAL MEDICINE
Payer: MEDICARE

## 2024-06-20 DIAGNOSIS — Z12.31 SCREENING MAMMOGRAM FOR BREAST CANCER: ICD-10-CM

## 2024-06-20 PROCEDURE — 77063 BREAST TOMOSYNTHESIS BI: CPT

## 2024-06-20 PROCEDURE — 77067 SCR MAMMO BI INCL CAD: CPT

## 2024-08-26 ENCOUNTER — HOSPITAL ENCOUNTER (OUTPATIENT)
Dept: CT IMAGING | Facility: HOSPITAL | Age: 69
Discharge: HOME | End: 2024-08-26
Admitting: INTERNAL MEDICINE
Payer: MEDICARE

## 2024-08-26 DIAGNOSIS — Z87.891 PERSONAL HISTORY OF NICOTINE DEPENDENCE: ICD-10-CM

## 2024-08-26 PROCEDURE — 71271 CT THORAX LUNG CANCER SCR C-: CPT

## 2024-08-27 ENCOUNTER — HOSPITAL ENCOUNTER (OUTPATIENT)
Dept: GENERAL RADIOLOGY | Facility: HOSPITAL | Age: 69
Discharge: HOME | End: 2024-08-27
Admitting: INTERNAL MEDICINE
Payer: MEDICARE

## 2024-08-27 ENCOUNTER — TRANSCRIBE ORDERS (OUTPATIENT)
Dept: ADMINISTRATIVE | Facility: HOSPITAL | Age: 69
End: 2024-08-27
Payer: MEDICARE

## 2024-08-27 ENCOUNTER — DOCUMENTATION (OUTPATIENT)
Dept: PULMONOLOGY | Facility: CLINIC | Age: 69
End: 2024-08-27
Payer: MEDICARE

## 2024-08-27 ENCOUNTER — TELEPHONE (OUTPATIENT)
Dept: PULMONOLOGY | Facility: CLINIC | Age: 69
End: 2024-08-27
Payer: MEDICARE

## 2024-08-27 DIAGNOSIS — M54.50 LUMBOSACRAL PAIN: ICD-10-CM

## 2024-08-27 DIAGNOSIS — M54.2 NECK PAIN: Primary | ICD-10-CM

## 2024-08-27 PROCEDURE — 72114 X-RAY EXAM L-S SPINE BENDING: CPT

## 2024-08-27 PROCEDURE — 72052 X-RAY EXAM NECK SPINE 6/>VWS: CPT

## 2024-08-27 NOTE — TELEPHONE ENCOUNTER
Spoke with pt, She will come in Tuesday 9/3 to see Carola at 230 for post CT follow up. She verbalized good understanding and appreciation

## 2024-08-27 NOTE — TELEPHONE ENCOUNTER
----- Message from Rodrick Figueroa sent at 8/27/2024  3:32 PM EDT -----  Regarding: Needs FU  Does not look like the patient's been in the office for over a year.  Can you get her a follow-up appointment with either me or the APRN to discuss her CT scan results?    Thank you    RT

## 2024-08-27 NOTE — PROGRESS NOTES
The patient underwent a screening CT which appears to be a Lung RADS category 1. She hasn't been to the office for over a year so I have asked the office staff to get her set up with an office appointment to discuss the CT scan, her status, and what further evaluation is needed in the future.

## 2024-08-31 NOTE — PROGRESS NOTES
[facility] Follow up    CHIEF COMPLAINT    Pulmonary nodules, prior cigarette use, and electronic cigarette use.    HISTORY OF PRESENT ILLNESS    HPI:   Rivka Marquez is a 68 y.o.female followed by pulmonary regarding her pulmonary nodules, prior cigarette use, and electronic cigarette use.    Does have a history of tobacco use that resolved in 2020.  She does continue to vape nicotine products.    Does have a history of pulmonary nodules noted on prior chest imaging and LDCT from July 2023 was unrevealing for any suspicious nodules/masses.    Does have a prior MVR is chronically anticoagulated on warfarin.    LDCT from last year did show a 4.7 cm ascending thoracic aortic aneurysm.  She is followed by Dr. Mike.  Previously was followed by Dr. Hurley who performed her mechanical MVR.    PFTs from 2013 were unrevealing for obstruction or restriction.    History of reflux on Protonix.    Interval history:   Patient was seen in the office last August followed by Dr. Figueroa.  Continues to do well from a respiratory standpoint.    Did have her LDCT of the chest completed which is unrevealing for any suspicious pulmonary nodules or masses.  Continues to reiterate a ascending aortic aneurysm measuring 4.7 cm.  Also a poorly defined left thyroid mass measuring 2.7 cm unchanged from the 2023 study.    Denies recent ED visits, hospitalizations, or exacerbations.     Denies fever, chills, night sweats, or hemoptysis. No recent sick contacts. No chest pain or palpitations. Denies lower extremity swelling or calf tenderness.     Patient Active Problem List   Diagnosis    Tobacco abuse (Last cig ~12/2020)    Bilateral solid and semisolid nodules    Chronic anticoagulation (Warfarin)    Mitral valve disease (S/P MVR)       Allergies   Allergen Reactions    Vitamin K Confusion       Current Outpatient Medications:     ALPRAZolam (XANAX) 0.5 MG tablet, Take  by mouth., Disp: , Rfl:     aspirin (aspirin) 81 MG EC tablet,  Take  by mouth Daily., Disp: , Rfl:     atorvastatin (LIPITOR) 40 MG tablet, Take  by mouth Daily., Disp: , Rfl:     clopidogrel (PLAVIX) 75 MG tablet, Take  by mouth Daily., Disp: , Rfl:     DULoxetine (CYMBALTA) 60 MG capsule, Take 1 capsule by mouth Daily., Disp: , Rfl:     gabapentin (NEURONTIN) 300 MG capsule, Take 1 capsule by mouth., Disp: , Rfl:     lisinopril (PRINIVIL,ZESTRIL) 20 MG tablet, Take 1 tablet by mouth Daily., Disp: , Rfl:     Magnesium Gluconate 550 MG tablet, Take 30 mg by mouth., Disp: , Rfl:     metoprolol succinate XL (TOPROL-XL) 100 MG 24 hr tablet, Take 1 tablet by mouth Daily., Disp: , Rfl:     NIFEdipine XL (PROCARDIA XL) 30 MG 24 hr tablet, Take  by mouth Daily., Disp: , Rfl:     raloxifene (EVISTA) 60 MG tablet, Take 1 tablet by mouth Daily., Disp: , Rfl:     traMADol (ULTRAM) 50 MG tablet, Take  by mouth 3 (Three) Times a Day., Disp: , Rfl:     traZODone (DESYREL) 100 MG tablet, Take  by mouth., Disp: , Rfl:     warfarin (COUMADIN) 5 MG tablet, Take 1 tablet by mouth Daily., Disp: , Rfl:     diclofenac (VOLTAREN) 75 MG EC tablet, Take  by mouth. (Patient not taking: Reported on 9/3/2024), Disp: , Rfl:     pantoprazole (PROTONIX) 40 MG EC tablet, Take  by mouth Daily. (Patient not taking: Reported on 9/3/2024), Disp: , Rfl:   MEDICATION LIST AND ALLERGIES REVIEWED.    Social History     Tobacco Use    Smoking status: Some Days     Types: Electronic Cigarette    Smokeless tobacco: Never    Tobacco comments:     currently vapes as of 7/1/21   Vaping Use    Vaping status: Every Day   Substance Use Topics    Alcohol use: Not Currently    Drug use: Never       FAMILY AND SOCIAL HISTORY REVIEWED.    Review of Systems   Constitutional:  Negative for chills, fatigue and fever.   Respiratory:  Negative for cough, chest tightness, shortness of breath and wheezing.    Cardiovascular:  Negative for chest pain, palpitations and leg swelling.   Skin:  Negative for color change.  "  Psychiatric/Behavioral:  Negative for sleep disturbance.    All other systems reviewed and are negative.  .    /76   Pulse 74   Temp 97 °F (36.1 °C) (Infrared)   Ht 160 cm (62.99\")   Wt 61.7 kg (136 lb)   LMP  (LMP Unknown)   SpO2 90% Comment: room air at rest  BMI 24.10 kg/m²     Immunization History   Administered Date(s) Administered    COVID-19 (MODERNA) 1st,2nd,3rd Dose Monovalent 02/23/2021, 03/24/2021    Flu Vaccine Quad PF >36MO 10/09/2017, 10/24/2018, 10/09/2019, 10/13/2020    Fluzone  >6mos 11/16/2016    Fluzone (or Fluarix & Flulaval for VFC) >6mos 10/09/2017, 10/24/2018, 10/09/2019, 10/13/2020       Physical Exam  Vitals reviewed.   Constitutional:       General: She is not in acute distress.     Appearance: Normal appearance.   Cardiovascular:      Rate and Rhythm: Normal rate and regular rhythm.      Pulses: Normal pulses.      Heart sounds: Murmur heard.      Comments: Mechanical click   Pulmonary:      Effort: Pulmonary effort is normal. No respiratory distress.      Breath sounds: No wheezing, rhonchi or rales.   Skin:     General: Skin is warm and dry.   Neurological:      Mental Status: She is alert.         RESULTS    PFTs:  None on file    Imaging:   LDCT of the chest 8/26/2024  Impression:  1. No significant pulmonary nodules or masses identified.  2. Postop changes of mitral valve replacement with coronary artery atherosclerotic calcifications and aneurysmal dilatation of the ascending aorta measuring up to 4.7 cm. Postop changes of prior transvenous pacemaker placement.    PROBLEM LIST    Problem List Items Addressed This Visit       Tobacco abuse (Last cig ~12/2020) - Primary    Relevant Orders    CT Chest Without Contrast Low Dose Follow Up     Other Visit Diagnoses       Thyroid mass        Relevant Orders    Ambulatory Referral to Endocrinology          DISCUSSION    Ms. Marquez was seen today for follow-up and is stable from pulmonary standpoint.    Former tobacco use "   Electronic cigarette use  Patient is a former cigarette smoker with ongoing electronic cigarette use  Complete cessation highly recommended- currently no cessation date is set   We did discuss smoking cessation methods today and the patient will contact me when ready to implement these changes   Prior LDCT of the chest from August was unrevealing for suspicious pulmonary nodules- will repeat in 1 year due August 2025 (order placed)   Not on any maintenance inhalers  PFTs at next visit  A1AT screening today    4.7 cm ascending aortic aneurysm  Followed by Dr. Mike    Left thyroid mass  Noted on most recent LDCT of the chest dating back to 6/2022 imaging  Referred to endocrinology for consideration of biopsy    Declines pneumococcal vaccine at today's visit.     I personally spent a total of 32 minutes on patient visit today including chart review, face to face with the patient obtaining the history and physical exam, review of pertinent images and tests, counseling and discussion and/or coordination of care as described above, and documentation.  Total time excludes time spent on other separate services such as performing procedures or test interpretation, if applicable.    Electronically signed by IVETT Maurice, 09/03/24, 1:18 PM EDT.    Please note that portions of this note were completed with a voice recognition program.      CC: Margo Willingham MD

## 2024-09-03 ENCOUNTER — OFFICE VISIT (OUTPATIENT)
Dept: PULMONOLOGY | Facility: CLINIC | Age: 69
End: 2024-09-03
Payer: MEDICARE

## 2024-09-03 VITALS
TEMPERATURE: 97 F | WEIGHT: 136 LBS | OXYGEN SATURATION: 90 % | BODY MASS INDEX: 24.1 KG/M2 | HEIGHT: 63 IN | DIASTOLIC BLOOD PRESSURE: 76 MMHG | HEART RATE: 74 BPM | SYSTOLIC BLOOD PRESSURE: 118 MMHG

## 2024-09-03 DIAGNOSIS — E07.9 THYROID MASS: ICD-10-CM

## 2024-09-03 DIAGNOSIS — Z72.0 TOBACCO ABUSE: Primary | ICD-10-CM

## 2024-09-03 PROCEDURE — 1159F MED LIST DOCD IN RCRD: CPT | Performed by: NURSE PRACTITIONER

## 2024-09-03 PROCEDURE — 99214 OFFICE O/P EST MOD 30 MIN: CPT | Performed by: NURSE PRACTITIONER

## 2024-09-03 PROCEDURE — 1160F RVW MEDS BY RX/DR IN RCRD: CPT | Performed by: NURSE PRACTITIONER

## 2024-11-21 NOTE — PROGRESS NOTES
Chief complaint/Reason for consult: Thyroid nodule    Consult requested by IVETT Maurice    HPI: Patient is a 69 year old female here for evaluation of a thyroid nodule.     # Thyroid nodule:   - First noted ~12 years ago   - Last thyroid ultrasound: 1/21/2015 showing a stable multinodular thyroid with the largest nodule measuring 2.9 cm in the left lobe containing some calcifications that were stable from ultrasound done in 2013  - Prior FNA's: None  - Denies dysphagia and dysphonia  - Has some dyspnea that she thinks is cardiac related   - Denies history of radiation to the head/neck  - No known family history of thyroid cancer  - Has a history of tobacco use (~35 pack year history, quit in 2013 but now vapes)    - TSH 1.389 12/31/2021    Patient also reports today having unexplained weight gain and fatigue, reports that her TSH was slightly elevated when last checked but I do not have these records.  She has no known underlying thyroid disease.    Patient reports having higher calcium levels, does not take a thiazide diuretic.    Review of Systems   Constitutional:  Positive for unexpected weight change.   HENT:  Negative for trouble swallowing and voice change.    Eyes:  Negative for visual disturbance.   Respiratory:  Positive for shortness of breath.    Cardiovascular:  Negative for chest pain.   Gastrointestinal:  Negative for abdominal pain.   Endocrine: Negative for cold intolerance and heat intolerance.   Musculoskeletal:  Negative for gait problem.   Skin:  Negative for rash.   Neurological:  Negative for numbness.   Psychiatric/Behavioral:  Negative for agitation.         Physical Exam  Vitals reviewed.   Constitutional:       General: She is not in acute distress.  HENT:      Head: Normocephalic.      Nose: Nose normal.   Eyes:      Conjunctiva/sclera: Conjunctivae normal.   Cardiovascular:      Rate and Rhythm: Normal rate.      Pulses: Normal pulses.   Pulmonary:      Effort: Pulmonary effort is  normal.   Abdominal:      Tenderness: There is no guarding.   Musculoskeletal:      Cervical back: Neck supple.      Comments: Antalgic gait   Skin:     General: Skin is warm and dry.   Neurological:      Mental Status: She is alert and oriented to person, place, and time.   Psychiatric:         Mood and Affect: Mood normal.        Neck Ultrasound Report    Indication: Thyroid nodule    Comparison Imaging: no     Real time high resolution imaging of the thyroid gland was performed in transverse and longitudinal planes.        The right thyroid lobe measured 3.86 cm length x 1.75 cm AP x 1.44 cm in TV dimension. The isthmus measured 9.4 mm in thickness. The left thyroid lobe measured 5.25 cm length x 2.74 cm AP x 3.20 cm in TV dimension.      Nodule #1:   Right mid lobe; L 1.46cm x TV 1.18cm x AP 0.83cm   Spongiform  No calcifications  Well-defined margins/Halo  Grade 1 vascularity    Nodule #2  Isthmus; TV 1.21cm x AP 0.81cm   Hypoechoic  Macrocalcification present  Difficult to appreciate margins  Grade 2 vascularity     Nodule #3  1.  Left mid thyroid lobe; L 4.02 cm x TV 3.47cm x AP 2.07cm   2.  Difficult to tell if it is multiple nodules with poorly defined borders that have coalesced together or 1 large nodule that is very heterogenous, there are macrocalcifications present, portions of hyperechoic and hypoechoic areas and poorly defined borders without halo  There are portions with significant vascular flow in their portions with no vascular flow    No pathologic lymph nodes were seen.     Impression: Suspicious left-sided nodule that I recommend FNA, multiple other small nodules, spongiform nodules and nodules that do not meet criteria for FNA at this time    Images saved to PACS.     Ultrasound Guided Thyroid Biopsy    Comparison Report: No    Indication:  Thyroid nodule    After informed consent, the neck was prepped in sterile fashion. Real time high resolution ultrasound imaging demonstrated a    With  ultrasound guidance of needle localization, 4 aspirates were obtained with sterile 27g. needles. Sample was placed in CytoLyte and sample was also collected for Afirma genetic testing if needed.    The patient tolerated the procedure without difficulty or complications.      Assessment and plan:     Diagnoses and all orders for this visit:    1. Multiple thyroid nodules (Primary)  Assessment & Plan:  -Patient with a suspicious left-sided nodule, difficult to appreciate if it is multiple nodules abutting each other or 1 large heterogenous nodule, FNA was done of the most suspicious areas today  -Will follow-up pending FNA results      Orders:  -     US Thyroid; Future  -     TSH; Future  -     T4, Free; Future    2. Hypercalcemia  -     Comprehensive Metabolic Panel; Future  -     PTH, Intact; Future  -     Vitamin D 25 Hydroxy; Future         Return in about 6 months (around 5/22/2025) for Thyroid nodule.     Electronically signed by Kyle S Rosenstein, MD   Addendum  Labs 11/22/2024  CMP with total calcium 10.6 and albumin 3.8   PTH 57.8  Cr 0.96  TSH 1.230 and FT4 1.23  25-OH Vitamin D 64.3  FNA results c/w benign follicular nodule, will repeat ultrasound in 6 mos, may need to repeat FNA at that time as well if nodule has changed   Patient with possible primary hyperparathyroidism, need to check 24 hour urine studies and if consistent with primary hyperparathyroidism with follow-up with DXA, KUB and plain films of spine.     Discussed plan on phone with patient

## 2024-11-22 ENCOUNTER — OFFICE VISIT (OUTPATIENT)
Dept: ENDOCRINOLOGY | Facility: CLINIC | Age: 69
End: 2024-11-22
Payer: MEDICARE

## 2024-11-22 VITALS
DIASTOLIC BLOOD PRESSURE: 74 MMHG | HEART RATE: 65 BPM | WEIGHT: 138 LBS | SYSTOLIC BLOOD PRESSURE: 106 MMHG | HEIGHT: 63 IN | OXYGEN SATURATION: 94 % | BODY MASS INDEX: 24.45 KG/M2

## 2024-11-22 DIAGNOSIS — E83.52 HYPERCALCEMIA: ICD-10-CM

## 2024-11-22 DIAGNOSIS — E04.2 MULTIPLE THYROID NODULES: Primary | ICD-10-CM

## 2024-11-22 LAB
25(OH)D3 SERPL-MCNC: 64.3 NG/ML (ref 30–100)
ALBUMIN SERPL-MCNC: 3.8 G/DL (ref 3.5–5.2)
ALBUMIN/GLOB SERPL: 1 G/DL
ALP SERPL-CCNC: 90 U/L (ref 39–117)
ALT SERPL W P-5'-P-CCNC: 16 U/L (ref 1–33)
ANION GAP SERPL CALCULATED.3IONS-SCNC: 7.9 MMOL/L (ref 5–15)
AST SERPL-CCNC: 17 U/L (ref 1–32)
BILIRUB SERPL-MCNC: 0.3 MG/DL (ref 0–1.2)
BUN SERPL-MCNC: 18 MG/DL (ref 8–23)
BUN/CREAT SERPL: 18.8 (ref 7–25)
CALCIUM SPEC-SCNC: 10.6 MG/DL (ref 8.6–10.5)
CHLORIDE SERPL-SCNC: 104 MMOL/L (ref 98–107)
CO2 SERPL-SCNC: 28.1 MMOL/L (ref 22–29)
CREAT SERPL-MCNC: 0.96 MG/DL (ref 0.57–1)
EGFRCR SERPLBLD CKD-EPI 2021: 64.2 ML/MIN/1.73
GLOBULIN UR ELPH-MCNC: 3.7 GM/DL
GLUCOSE SERPL-MCNC: 99 MG/DL (ref 65–99)
POTASSIUM SERPL-SCNC: 3.9 MMOL/L (ref 3.5–5.2)
PROT SERPL-MCNC: 7.5 G/DL (ref 6–8.5)
PTH-INTACT SERPL-MCNC: 57.8 PG/ML (ref 15–65)
SODIUM SERPL-SCNC: 140 MMOL/L (ref 136–145)
T4 FREE SERPL-MCNC: 1.23 NG/DL (ref 0.92–1.68)
TSH SERPL DL<=0.05 MIU/L-ACNC: 1.23 UIU/ML (ref 0.27–4.2)

## 2024-11-22 PROCEDURE — 80053 COMPREHEN METABOLIC PANEL: CPT | Performed by: HOSPITALIST

## 2024-11-22 PROCEDURE — 82306 VITAMIN D 25 HYDROXY: CPT | Performed by: HOSPITALIST

## 2024-11-22 PROCEDURE — 84439 ASSAY OF FREE THYROXINE: CPT | Performed by: HOSPITALIST

## 2024-11-22 PROCEDURE — 84443 ASSAY THYROID STIM HORMONE: CPT | Performed by: HOSPITALIST

## 2024-11-22 PROCEDURE — 83970 ASSAY OF PARATHORMONE: CPT | Performed by: HOSPITALIST

## 2024-11-22 NOTE — ASSESSMENT & PLAN NOTE
-Patient with a suspicious left-sided nodule, difficult to appreciate if it is multiple nodules abutting each other or 1 large heterogenous nodule, FNA was done of the most suspicious areas today  -Will follow-up pending FNA results

## 2024-11-25 LAB — REF LAB TEST METHOD: NORMAL

## 2024-12-31 ENCOUNTER — TRANSCRIBE ORDERS (OUTPATIENT)
Dept: ADMINISTRATIVE | Facility: HOSPITAL | Age: 69
End: 2024-12-31
Payer: MEDICARE

## 2024-12-31 DIAGNOSIS — R79.89: ICD-10-CM

## 2024-12-31 DIAGNOSIS — R79.89 ELEVATED BRAIN NATRIURETIC PEPTIDE (BNP) LEVEL: Primary | ICD-10-CM

## 2024-12-31 DIAGNOSIS — T36.7X5A: ICD-10-CM

## 2025-02-09 PROBLEM — I71.21 ANEURYSM OF ASCENDING AORTA WITHOUT RUPTURE: Status: ACTIVE | Noted: 2025-02-09

## 2025-02-10 ENCOUNTER — TELEPHONE (OUTPATIENT)
Dept: PHARMACY | Facility: HOSPITAL | Age: 70
End: 2025-02-10
Payer: MEDICARE

## 2025-02-10 ENCOUNTER — OFFICE VISIT (OUTPATIENT)
Age: 70
End: 2025-02-10
Payer: MEDICARE

## 2025-02-10 VITALS
HEIGHT: 60 IN | WEIGHT: 139.19 LBS | HEART RATE: 63 BPM | DIASTOLIC BLOOD PRESSURE: 81 MMHG | SYSTOLIC BLOOD PRESSURE: 131 MMHG | BODY MASS INDEX: 27.33 KG/M2

## 2025-02-10 DIAGNOSIS — I10 PRIMARY HYPERTENSION: ICD-10-CM

## 2025-02-10 DIAGNOSIS — E78.2 MIXED HYPERLIPIDEMIA: ICD-10-CM

## 2025-02-10 DIAGNOSIS — I25.10 CORONARY ARTERY DISEASE INVOLVING NATIVE CORONARY ARTERY OF NATIVE HEART WITHOUT ANGINA PECTORIS: ICD-10-CM

## 2025-02-10 DIAGNOSIS — I05.9 MITRAL VALVE DISEASE: Primary | ICD-10-CM

## 2025-02-10 RX ORDER — VIT C/B6/B5/MAGNESIUM/HERB 173 50-5-6-5MG
500 CAPSULE ORAL DAILY
COMMUNITY

## 2025-02-10 NOTE — PROGRESS NOTES
Cardiology Follow-Up Note     Name: Rivka Marquez  :   1955  PCP: Margo Willingham MD  Date:   02/10/2025  Department: [facility] CARDIOLOGY  [address]  [facility] [address]  Fax [phone]  Phone [phone]    Chief Complaint   Patient presents with    Coronary Artery Disease       Subjective     History of Present Illness  Rivka Marquez is a 69 y.o. female who presents today for follow-up on chronic condition, patient has history of non-ST ovation myocardial infarction 3/3/2013, received car catheterization with stent to the left circumflex coronary artery, patient had pulmonary embolism also on 3/9/2013, heart cath 2013 with ejection fraction 55% patent stent to the circumflex coronary artery and chronically occluded right coronary artery with collaterals.  The patient had mitral valve replacement mechanical by  on 10/8/2013 Ronen  mechanical prosthesis.      Current Outpatient Medications:     ALPRAZolam (XANAX) 0.5 MG tablet, Take  by mouth., Disp: , Rfl:     aspirin (aspirin) 81 MG EC tablet, Take  by mouth Daily., Disp: , Rfl:     atorvastatin (LIPITOR) 40 MG tablet, Take  by mouth Daily. (Patient taking differently: Take 0.5 tablets by mouth Daily.), Disp: , Rfl:     diclofenac (VOLTAREN) 75 MG EC tablet, Take  by mouth., Disp: , Rfl:     DULoxetine (CYMBALTA) 60 MG capsule, Take 1 capsule by mouth Daily., Disp: , Rfl:     gabapentin (NEURONTIN) 300 MG capsule, Take 1 capsule by mouth., Disp: , Rfl:     lisinopril (PRINIVIL,ZESTRIL) 20 MG tablet, Take 1 tablet by mouth Daily., Disp: , Rfl:     Magnesium Gluconate 550 MG tablet, Take 30 mg by mouth., Disp: , Rfl:     metoprolol succinate XL (TOPROL-XL) 100 MG 24 hr tablet, Take 1 tablet by mouth Daily., Disp: , Rfl:     NIFEdipine XL (PROCARDIA XL) 30 MG 24 hr tablet, Take  by mouth Daily. (Patient taking differently: Take 3 tablets by mouth Daily.), Disp: , Rfl:  "    raloxifene (EVISTA) 60 MG tablet, Take 1 tablet by mouth Daily., Disp: , Rfl:     traMADol (ULTRAM) 50 MG tablet, Take  by mouth 3 (Three) Times a Day., Disp: , Rfl:     traZODone (DESYREL) 100 MG tablet, Take  by mouth., Disp: , Rfl:     Turmeric 500 MG capsule, Take 1 capsule by mouth Daily., Disp: , Rfl:     vitamin D3 125 MCG (5000 UT) capsule capsule, Take 1 capsule by mouth Daily., Disp: , Rfl:     warfarin (COUMADIN) 5 MG tablet, Take 1 tablet by mouth Daily., Disp: , Rfl:     Objective     Vital Signs:  /81 (BP Location: Right arm, Patient Position: Sitting)   Pulse 63   Ht 152.4 cm (60\")   Wt 63.1 kg (139 lb 3 oz)   BMI 27.18 kg/m²   Estimated body mass index is 27.18 kg/m² as calculated from the following:    Height as of this encounter: 152.4 cm (60\").    Weight as of this encounter: 63.1 kg (139 lb 3 oz).       BMI is within normal parameters. No other follow-up for BMI required.      Vitals reviewed.   Constitutional:       Appearance: Normal and healthy appearance.   Eyes:      Pupils: Pupils are equal, round, and reactive to light.   Pulmonary:      Effort: Pulmonary effort is normal.   Chest:      Chest wall: Not tender to palpatation.   Cardiovascular:      PMI at left midclavicular line. Normal rate. Regular rhythm.      No gallop.   click. Mechanical click audible   Pulses:     Intact distal pulses.   Edema:     Peripheral edema absent.   Skin:     General: Skin is warm.   Psychiatric:         Behavior: Behavior is cooperative.              Data Review:   Lab Results   Component Value Date    GLUCOSE 99 11/22/2024    BUN 18 11/22/2024    CREATININE 0.96 11/22/2024    EGFRIFNONA >60 12/31/2021    EGFRIFAFRI >60 12/31/2021    BCR 18.8 11/22/2024    K 3.9 11/22/2024    CO2 28.1 11/22/2024    CALCIUM 10.6 (H) 11/22/2024    ALBUMIN 3.8 11/22/2024    AST 17 11/22/2024    ALT 16 11/22/2024     Lab Results   Component Value Date    CHLPL 123 01/01/2022    TRIG 152 (H) 01/01/2022    HDL 44 " (L) 01/01/2022    LDL 48.6 01/01/2022      Lab Results   Component Value Date    WBC 7.41 12/31/2021    RBC 4.36 12/31/2021    HGB 13.3 12/31/2021    HCT 40.9 12/31/2021    MCV 94 12/31/2021     12/31/2021     Lab Results   Component Value Date    TSH 1.230 11/22/2024     Lab Results   Component Value Date    HGBA1C 5.6 12/31/2021     Lab Results   Component Value Date    INR 2.3 (H) 12/31/2021 1/22/2024 Transthoracic echo:  Ejection fraction 54% with prosthetic mitral valve seated well with trace regurgitation, left atrial cavity is dilated.  No evidence of pulmonary hypertension.    11/4/2024 CT of the chest:  Heterogeneous lesion in the left thyroid.  46 mm ascending aortic aneurysm.    1/6/2025: Device check [employer] Blue dual-chamber pacemaker in VVIR 60 bpm 93% ventricularly paced.  No issues 1.5 years left on GEN change.   1/4/2025 INR 3.1    Assessment and Plan     Assessment & Plan  Mitral valve disease (S/P MVR)  Patient will need echocardiogram for evaluation of her mechanical mitral valve.  Last echocardiogram was done in January 2024.  Discussed with the patient in detail.  She will continue her warfarin INR is therapeutic for now.  She understands the goals.  Will adjust patient INR.  The patient has another INR due tomorrow.       Coronary artery disease involving native coronary artery of native heart without angina pectoris    The patient will continue aspirin 81 mg once a day along with the warfarin.       Primary hypertension       The patient will continue lisinopril 20 mg once a day with nifedipine XL 30 mg once a day Toprol- mg once a day.  The patient will continue to watch her blood pressure and the goals are discussed.  Mixed hyperlipidemia       The patient will continue her Lipitor 40 mg once a day FLP LFTs prior to next follow-up.      Advised to continue current cardiac medications. Please notify of any issues. Discussed with the patient compliance with medical management  and follow-up.     Follow Up  Return for Follow-up post-testing.    Call if you have any significant symptoms or go to the [facility] if possible.     Ernst Mike MD, FACC,[facility].  [facility]    Part of this note may be an electronic transcription/translation of spoken language to printed text using the Dragon Dictation System.

## 2025-02-10 NOTE — ASSESSMENT & PLAN NOTE
Patient will need echocardiogram for evaluation of her mechanical mitral valve.  Last echocardiogram was done in January 2024.  Discussed with the patient in detail.  She will continue her warfarin INR is therapeutic for now.  She understands the goals.  Will adjust patient INR.  The patient has another INR due tomorrow.

## 2025-02-10 NOTE — TELEPHONE ENCOUNTER
Called to welcome the patient to clinic. She is a patient of Dr. Mike.     Most recent dosing information:    Date Schedule Weekly Dose   1/8/2025 5mg dialy excpet 2.5 MF 20mg     Dx:z95.2, 415.11,415.19    INR Goal 2-2.5    Called and LVM. Will need to follow up

## 2025-02-11 ENCOUNTER — TELEPHONE (OUTPATIENT)
Age: 70
End: 2025-02-11
Payer: MEDICARE

## 2025-02-11 LAB — INR PPP: 2

## 2025-02-11 NOTE — TELEPHONE ENCOUNTER
----- Message from Dee Gao sent at 2/11/2025  2:19 PM EST -----  Can we make sure this patient is being maintained by coumadin clinic here at ? Please let me know if not so they can be dosed

## 2025-02-11 NOTE — TELEPHONE ENCOUNTER
LVM this morning to f/u.   Patient had returned Aldo's call after hours yesterday and left the clinic a voicemail.

## 2025-02-12 ENCOUNTER — ANTICOAGULATION VISIT (OUTPATIENT)
Dept: PHARMACY | Facility: HOSPITAL | Age: 70
End: 2025-02-12
Payer: MEDICARE

## 2025-02-12 DIAGNOSIS — Z79.01 CHRONIC ANTICOAGULATION: Primary | ICD-10-CM

## 2025-02-12 DIAGNOSIS — I05.9 MITRAL VALVE DISEASE: ICD-10-CM

## 2025-02-12 NOTE — PROGRESS NOTES
Anticoagulation Clinic Progress Note  Indication: Mechanical Mitral Valve  Referring Provider: Dr. Ernst Mike   Initial Warfarin Start Date:   Planned Duration of Therapy:   Goal INR: 2.0-2.5  Current Drug Interactions:   Other: [PREVIOUS ANTICOAGULATION, ETC]  [id number]:  Bleed Risk: [HASBLED, if appropriate; HIGH/MODERATE/LOW + REASON, H/O BLEED]        Diet: [GLV INTAKE]  Alcohol:   Tobacco:   OTC Pain Medication:    Anticoagulation Clinic INR History:  Date 2/11          Total Weekly Dose 22.5 mg          INR 2.0          Notes Rec'd 2/12            Clinic Interview:  Tablet Strength:   Estimated OOP cost: [please send to registration if not already done]  Verbal Release: mailed out 2/12/25      Patient Findings    Negatives: Signs/symptoms of thrombosis, Signs/symptoms of bleeding, Laboratory test error suspected, Change in health, Change in alcohol use, Change in activity, Upcoming invasive procedure, Emergency department visit, Upcoming dental procedure, Missed doses, Extra doses, Change in medications, Change in diet/appetite, Hospital admission, Bruising, Other complaints   Comments: All findings negative per patient         Plan:  1. INR is therapeutic today at 2.0 (goal 2.0-2.5). Instructed Ms. Marquez to take warfarin 5mg daily except for warfarin 2.5 mg on Friday and Monday until recheck .    2. Recheck INR 2/18/25  3. Verbal and written information provided. Rivka Marquez expresses understanding by teach back and has no further questions at this time.    Shilpi Pablo, PharmD  2/12/2025  11:09 EST

## 2025-02-14 ENCOUNTER — HOSPITAL ENCOUNTER (OUTPATIENT)
Facility: HOSPITAL | Age: 70
Discharge: HOME | End: 2025-02-14
Payer: MEDICARE

## 2025-02-14 VITALS — BODY MASS INDEX: 27.31 KG/M2 | WEIGHT: 139.11 LBS | HEIGHT: 60 IN

## 2025-02-14 DIAGNOSIS — T36.7X5A: ICD-10-CM

## 2025-02-14 DIAGNOSIS — R79.89 ELEVATED BRAIN NATRIURETIC PEPTIDE (BNP) LEVEL: ICD-10-CM

## 2025-02-14 DIAGNOSIS — R79.89: ICD-10-CM

## 2025-02-14 PROCEDURE — 93306 TTE W/DOPPLER COMPLETE: CPT

## 2025-02-17 LAB
ASCENDING AORTA: 4.3 CM
AV MEAN PRESS GRAD SYS DOP V1V2: 7 MMHG
AV VMAX SYS DOP: 186.2 CM/SEC
BH CV ECHO MEAS - AO MAX PG: 13.9 MMHG
BH CV ECHO MEAS - AO ROOT DIAM: 2.9 CM
BH CV ECHO MEAS - AO V2 VTI: 34.6 CM
BH CV ECHO MEAS - AVA(I,D): 1.52 CM2
BH CV ECHO MEAS - EDV(CUBED): 97.3 ML
BH CV ECHO MEAS - EDV(MOD-SP2): 59.3 ML
BH CV ECHO MEAS - EDV(MOD-SP4): 80.3 ML
BH CV ECHO MEAS - EF(MOD-SP2): 55.1 %
BH CV ECHO MEAS - EF(MOD-SP4): 65.8 %
BH CV ECHO MEAS - ESV(CUBED): 24.4 ML
BH CV ECHO MEAS - ESV(MOD-SP2): 26.6 ML
BH CV ECHO MEAS - ESV(MOD-SP4): 27.5 ML
BH CV ECHO MEAS - FS: 37 %
BH CV ECHO MEAS - IVS/LVPW: 1.14 CM
BH CV ECHO MEAS - IVSD: 0.8 CM
BH CV ECHO MEAS - LA DIMENSION: 3.6 CM
BH CV ECHO MEAS - LAT PEAK E' VEL: 6.6 CM/SEC
BH CV ECHO MEAS - LV DIASTOLIC VOL/BSA (35-75): 50.3 CM2
BH CV ECHO MEAS - LV MASS(C)D: 108.5 GRAMS
BH CV ECHO MEAS - LV MAX PG: 3.2 MMHG
BH CV ECHO MEAS - LV MEAN PG: 1.67 MMHG
BH CV ECHO MEAS - LV SYSTOLIC VOL/BSA (12-30): 17.2 CM2
BH CV ECHO MEAS - LV V1 MAX: 86.2 CM/SEC
BH CV ECHO MEAS - LV V1 VTI: 18.5 CM
BH CV ECHO MEAS - LVIDD: 4.6 CM
BH CV ECHO MEAS - LVIDS: 2.9 CM
BH CV ECHO MEAS - LVOT AREA: 2.8 CM2
BH CV ECHO MEAS - LVOT DIAM: 1.9 CM
BH CV ECHO MEAS - LVPWD: 0.7 CM
BH CV ECHO MEAS - MED PEAK E' VEL: 6.1 CM/SEC
BH CV ECHO MEAS - MV DEC SLOPE: 947 CM/SEC2
BH CV ECHO MEAS - MV DEC TIME: 0.17 SEC
BH CV ECHO MEAS - MV E MAX VEL: 161.3 CM/SEC
BH CV ECHO MEAS - MV MAX PG: 12.7 MMHG
BH CV ECHO MEAS - MV MEAN PG: 3.2 MMHG
BH CV ECHO MEAS - MV V2 VTI: 38.1 CM
BH CV ECHO MEAS - MVA(VTI): 1.37 CM2
BH CV ECHO MEAS - PA ACC TIME: 0.06 SEC
BH CV ECHO MEAS - PA V2 MAX: 116 CM/SEC
BH CV ECHO MEAS - RAP SYSTOLE: 3 MMHG
BH CV ECHO MEAS - RVSP: 20 MMHG
BH CV ECHO MEAS - SV(LVOT): 52.4 ML
BH CV ECHO MEAS - SV(MOD-SP2): 32.7 ML
BH CV ECHO MEAS - SV(MOD-SP4): 52.8 ML
BH CV ECHO MEAS - SVI(LVOT): 32.8 ML/M2
BH CV ECHO MEAS - SVI(MOD-SP2): 20.5 ML/M2
BH CV ECHO MEAS - SVI(MOD-SP4): 33.1 ML/M2
BH CV ECHO MEAS - TAPSE (>1.6): 1.74 CM
BH CV ECHO MEAS - TR MAX PG: 17 MMHG
BH CV ECHO MEAS - TR MAX VEL: 204 CM/SEC
BH CV ECHO MEASUREMENTS AVERAGE E/E' RATIO: 25.4
BH CV VAS BP LEFT ARM: NORMAL MMHG
BH CV XLRA - TDI S': 10.4 CM/SEC
IVRT: 85 MS
LEFT ATRIUM VOLUME INDEX: 28.8 ML/M2
LV EF BIPLANE MOD: 63.4 %

## 2025-02-18 LAB — INR PPP: 3.5

## 2025-02-19 ENCOUNTER — TELEPHONE (OUTPATIENT)
Age: 70
End: 2025-02-19
Payer: MEDICARE

## 2025-02-19 ENCOUNTER — ANTICOAGULATION VISIT (OUTPATIENT)
Dept: PHARMACY | Facility: HOSPITAL | Age: 70
End: 2025-02-19
Payer: MEDICARE

## 2025-02-19 DIAGNOSIS — Z79.01 CHRONIC ANTICOAGULATION: Primary | ICD-10-CM

## 2025-02-19 DIAGNOSIS — I05.9 MITRAL VALVE DISEASE: ICD-10-CM

## 2025-02-19 RX ORDER — LISINOPRIL 40 MG/1
20 TABLET ORAL DAILY
COMMUNITY
Start: 2025-01-20

## 2025-02-19 RX ORDER — NIFEDIPINE 90 MG/1
90 TABLET, EXTENDED RELEASE ORAL DAILY
COMMUNITY
Start: 2025-01-27

## 2025-02-19 RX ORDER — BUMETANIDE 2 MG/1
1 TABLET ORAL DAILY
COMMUNITY
Start: 2024-12-09

## 2025-02-19 NOTE — PROGRESS NOTES
Anticoagulation Clinic Progress Note  Indication: Mechanical Mitral Valve  Referring Provider: Dr. Ernst Mike   Initial Warfarin Start Date: ~2013   Planned Duration of Therapy: indefinite   Goal INR: 2.0-2.5  Current Drug Interactions: ASA, duloxetine, trazodone, raloxifene, bumetanide  Other:     Diet: no more than 2 servings of GLV   Alcohol: No   Tobacco: Vape - 5 cartridges per week   OTC Pain Medication: No     Anticoagulation Clinic INR History:  Date 2/11 2/19         Total Weekly Dose 22.5 mg 27.5 mg         INR 2.0 3.5         Notes Rec'd 2/12            Clinic Interview:  Tablet Strength: 5 mg, 3 mg tablets  Estimated OOP cost: [please send to registration if not already done]  Verbal Release: mailed out 2/12/25  Lab       Patient Findings:  Negatives: Signs/symptoms of thrombosis, Signs/symptoms of bleeding, Laboratory test error suspected, Change in health, Change in alcohol use, Change in activity, Upcoming invasive procedure, Emergency department visit, Upcoming dental procedure, Missed doses, Extra doses, Change in medications, Change in diet/appetite, Hospital admission, Bruising, Other complaints   Comments: All findings negative today per patient.       Rivka Marquez is NOT a new start to warfarin therapy. She was by herself for counseling. Discussed warfarin's indication, mechanism, and dosing. Also enforced the importance of taking warfarin as instructed and at the same time every day, preferably in the evening so that we can make dose adjustments more easily following subsequent clinic visits. She expressed understanding of this fact. We also discussed what she should do about a missed dose; pts can take missed doses within about 12 hours of their usual scheduled dose, but she was instructed on the importance of not doubling up on doses unless told to do so by the warfarin clinic. Explained possible side effects of warfarin therapy, including increased risk of bleeding, s/sx of bleeding  and s/sx of any additional clots/PE/CVA. Also discussed monitoring of warfarin, the INR, goal INR range  2.0-2.5 , and the frequency of monitoring. She stated no problems with transportation or scheduling clinic appts in this manner. Reviewed drug/food/tobacco/EtOH interactions and provided written information covering these topics in more detail, explaining that green, leafy vegetables interact most heavily with warfarin. Instructed the pt not to take or discontinue any medications without informing her physician/pharmacist and reminded her to inform us of any dietary changes, as well. She expressed understanding of the information provided and has no additional questions at this time.      Plan:  1. INR is SUPRAtherapeutic today at 3.5 (goal 2.0-2.5). Instructed Ms. Marquez to take warfarin 2.5 mg daily except for warfarin 5 mg on Mon, Thurs, and Sat until recheck. New patient to us, has been on warfarin for 10+ years, INR seems to be rather labile. Will monitor and adjust regimen.   2. Recheck INR 2/25/25  3. Verbal and written information provided. Rivka Marquez expresses understanding by teach back and has no further questions at this time.    Jacquelyn Lau, PharmD  2/19/2025  10:05 EST

## 2025-02-25 ENCOUNTER — TRANSCRIBE ORDERS (OUTPATIENT)
Dept: GENERAL RADIOLOGY | Facility: HOSPITAL | Age: 70
End: 2025-02-25
Payer: MEDICARE

## 2025-02-25 ENCOUNTER — ANTICOAGULATION VISIT (OUTPATIENT)
Dept: PHARMACY | Facility: HOSPITAL | Age: 70
End: 2025-02-25
Payer: MEDICARE

## 2025-02-25 ENCOUNTER — HOSPITAL ENCOUNTER (OUTPATIENT)
Dept: GENERAL RADIOLOGY | Facility: HOSPITAL | Age: 70
Discharge: HOME | End: 2025-02-25
Admitting: INTERNAL MEDICINE
Payer: MEDICARE

## 2025-02-25 DIAGNOSIS — M25.551 RIGHT HIP PAIN: Primary | ICD-10-CM

## 2025-02-25 DIAGNOSIS — M25.551 RIGHT HIP PAIN: ICD-10-CM

## 2025-02-25 DIAGNOSIS — Z79.01 CHRONIC ANTICOAGULATION: Primary | ICD-10-CM

## 2025-02-25 DIAGNOSIS — I05.9 MITRAL VALVE DISEASE: ICD-10-CM

## 2025-02-25 LAB — INR PPP: 2

## 2025-02-25 PROCEDURE — 73502 X-RAY EXAM HIP UNI 2-3 VIEWS: CPT

## 2025-02-25 NOTE — PROGRESS NOTES
Anticoagulation Clinic Progress Note  Indication: Mechanical Mitral Valve  Referring Provider: Dr. Ernst Mike   Initial Warfarin Start Date: ~2013   Planned Duration of Therapy: indefinite   Goal INR: 2.0-2.5  Current Drug Interactions: ASA, duloxetine, trazodone, raloxifene, bumetanide, turmeric  Other:     Diet: no more than 2 servings of GLV  Alcohol: No   Tobacco: Vape - 5 cartridges per week   OTC Pain Medication: No     Anticoagulation Clinic INR History:  Date 2/11 2/19 2/25                Total Weekly Dose 22.5 mg 27.5 mg 25 mg                INR 2.0 3.5 2.0                Notes Rec'd 2/12  redx1                  Clinic Interview:  Tablet Strength: 5 mg, 3 mg tablets  Estimated OOP cost: [please send to registration if not already done]  Verbal Release: mailed out 2/12/25  Patient Contact Info: [phone]    Patient Findings  Positives: Change in diet/appetite   Negatives: Signs/symptoms of thrombosis, Signs/symptoms of bleeding, Laboratory test error suspected, Change in health, Change in alcohol use, Change in activity, Upcoming invasive procedure, Emergency department visit, Upcoming dental procedure, Missed doses, Extra doses, Change in medications, Hospital admission, Bruising, Other complaints   Comments: She had cooked greens at [facility] As Seen on TV when her INR was 3.5 and a salad.  Discussed the high amount of vitamin K in cooked greens.  She verbalized understanding.  Otherwise, above findings negative.       Plan:  1. INR is therapeutic today at 2.0 (goal 2.0-2.5). Instructed Ms. Marquez to continue recent maintenance regimen of warfarin 2.5 mg oral daily except 5 mg  MonThuSat until recheck.   New patient to [facility] clinic.  She has been on warfarin for 10+ years and INR seems to be rather labile. Will monitor and adjust regimen.   2. Recheck INR 3/4/25  3. Verbal information provided over the phone. Rivka Marquez expresses understanding by teach back and has no further questions at this  time.    Cara Goetz, PharmD  2/26/2025  11:21 EST

## 2025-03-04 ENCOUNTER — OFFICE VISIT (OUTPATIENT)
Dept: PULMONOLOGY | Facility: CLINIC | Age: 70
End: 2025-03-04
Payer: MEDICARE

## 2025-03-04 VITALS
SYSTOLIC BLOOD PRESSURE: 122 MMHG | OXYGEN SATURATION: 96 % | BODY MASS INDEX: 28.91 KG/M2 | HEART RATE: 60 BPM | DIASTOLIC BLOOD PRESSURE: 80 MMHG | TEMPERATURE: 97.8 F | HEIGHT: 57 IN | WEIGHT: 134 LBS

## 2025-03-04 DIAGNOSIS — R91.8 MULTIPLE PULMONARY NODULES: Primary | ICD-10-CM

## 2025-03-04 DIAGNOSIS — I05.9 MITRAL VALVE DISEASE: ICD-10-CM

## 2025-03-04 DIAGNOSIS — R06.02 SHORTNESS OF BREATH: ICD-10-CM

## 2025-03-04 DIAGNOSIS — Z72.0 TOBACCO ABUSE: ICD-10-CM

## 2025-03-04 DIAGNOSIS — Z79.01 CHRONIC ANTICOAGULATION: ICD-10-CM

## 2025-03-04 RX ORDER — ALBUTEROL SULFATE 90 UG/1
2 INHALANT RESPIRATORY (INHALATION) EVERY 4 HOURS PRN
Qty: 18 G | Refills: 11 | Status: SHIPPED | OUTPATIENT
Start: 2025-03-04

## 2025-03-04 NOTE — PROGRESS NOTES
[facility] Follow up    CHIEF COMPLAINT    Pulmonary nodules, prior cigarette use, and electronic cigarette use.    HISTORY OF PRESENT ILLNESS    HPI:   Rivka Marquez is a 69 y.o.female followed by pulmonary regarding her pulmonary nodules, prior cigarette use, and electronic cigarette use.    Establish care with our clinic back in 2013 followed by Dr. Kingsley on and has been largely followed by Dr. Figueroa.    Does have a history of tobacco use that resolved in 2020.  She does continue to vape nicotine products.  PFTs from 2013 unrevealing for obstruction or restriction.    Does have a history of pulmonary nodules noted on prior chest imaging and most recent LDCTwas unrevealing for any suspicious nodules/masses.  It did reiterate a 4.7 cm ascending thoracic aortic aneurysm.  She is followed by Dr. Mike.  Previously was followed by Dr. Hurley who performed her mechanical MVR for which she is chronically anticoagulated on warfarin.    Chest imaging also displayed a poorly defined left thyroid mass measuring 2.7 cm unchanged from the 2023 study.  She was referred to endocrinology.    History of reflux on Protonix.    Interval history:   Patient was last seen in the office by me in September 2024 after completing her LDCT of the chest.    Has done well from a respiratory standpoint.  Denies any illnesses or oral steroid use.    Ongoing electronic cigarette use.     Given her poorly defined left thyroid mass she was referred to endocrinology and underwent FNA that was unrevealing for malignancy audible with a benign follicular nodule.    Denies recent ED visits, hospitalizations, or exacerbations.     Denies fever, chills, night sweats, or hemoptysis. No recent sick contacts. No chest pain or palpitations. Denies lower extremity swelling or calf tenderness.     Patient Active Problem List   Diagnosis    Tobacco abuse (Last cig ~12/2020)    Bilateral solid and semisolid nodules    Chronic anticoagulation  (Warfarin)    Mitral valve disease (S/P MVR)    Multiple thyroid nodules    Hypercalcemia    Aneurysm of ascending aorta without rupture       Allergies   Allergen Reactions    Vitamin K Seizure       Current Outpatient Medications:     ALPRAZolam (XANAX) 0.5 MG tablet, Take  by mouth., Disp: , Rfl:     aspirin (aspirin) 81 MG EC tablet, Take  by mouth Daily., Disp: , Rfl:     atorvastatin (LIPITOR) 40 MG tablet, Take  by mouth Daily. (Patient taking differently: Take 0.5 tablets by mouth Daily.), Disp: , Rfl:     bumetanide (BUMEX) 2 MG tablet, Take 1 tablet by mouth Daily As Needed (if SOA or stomach distended or ankles swollen)., Disp: , Rfl:     DULoxetine (CYMBALTA) 60 MG capsule, Take 1 capsule by mouth Daily., Disp: , Rfl:     gabapentin (NEURONTIN) 300 MG capsule, Take 1-2 capsules by mouth. 2 tabs PM, 1 tab AM, Disp: , Rfl:     lisinopril (PRINIVIL,ZESTRIL) 40 MG tablet, Take 0.5 tablets by mouth Daily., Disp: , Rfl:     Magnesium Gluconate 550 MG tablet, Take 30 mg by mouth., Disp: , Rfl:     metoprolol succinate XL (TOPROL-XL) 100 MG 24 hr tablet, Take 1 tablet by mouth Daily., Disp: , Rfl:     NIFEdipine XL (PROCARDIA XL) 90 MG 24 hr tablet, Take 1 tablet by mouth Daily., Disp: , Rfl:     raloxifene (EVISTA) 60 MG tablet, Take 1 tablet by mouth Daily., Disp: , Rfl:     traZODone (DESYREL) 100 MG tablet, Take  by mouth., Disp: , Rfl:     Turmeric 500 MG capsule, Take 1 capsule by mouth Daily., Disp: , Rfl:     vitamin D3 125 MCG (5000 UT) capsule capsule, Take 1 capsule by mouth Daily., Disp: , Rfl:     warfarin (COUMADIN) 5 MG tablet, Take 1 tablet by mouth Daily., Disp: , Rfl:     albuterol sulfate  (90 Base) MCG/ACT inhaler, Inhale 2 puffs Every 4 (Four) Hours As Needed for Wheezing., Disp: 18 g, Rfl: 11  MEDICATION LIST AND ALLERGIES REVIEWED.    Social History     Tobacco Use    Smoking status: Some Days     Types: Electronic Cigarette    Smokeless tobacco: Never    Tobacco comments:      "currently vapes as of 7/1/21   Vaping Use    Vaping status: Every Day    Substances: Nicotine    Devices: Refillable tank    Passive vaping exposure: Yes   Substance Use Topics    Alcohol use: Not Currently    Drug use: Never       FAMILY AND SOCIAL HISTORY REVIEWED.    Review of Systems   Constitutional:  Negative for chills, fatigue and fever.   Respiratory:  Negative for cough, chest tightness, shortness of breath and wheezing.    Cardiovascular:  Negative for chest pain, palpitations and leg swelling.   Skin:  Negative for color change.   Psychiatric/Behavioral:  Negative for sleep disturbance.    All other systems reviewed and are negative.    /80   Pulse 60   Temp 97.8 °F (36.6 °C)   Ht 144.8 cm (57\")   Wt 60.8 kg (134 lb)   LMP  (LMP Unknown)   SpO2 96% Comment: resting, room air  BMI 29.00 kg/m²     Immunization History   Administered Date(s) Administered    COVID-19 (MODERNA) 1st,2nd,3rd Dose Monovalent 02/23/2021, 03/24/2021    Flu Vaccine Quad PF >36MO 10/09/2017, 10/24/2018, 10/09/2019, 10/13/2020    Fluzone  >6mos 11/16/2016    Fluzone (or Fluarix & Flulaval for VFC) >6mos 10/09/2017, 10/24/2018, 10/09/2019, 10/13/2020    Fluzone High-Dose 65+YRS 10/08/2024       Physical Exam  Vitals reviewed.   Constitutional:       General: She is not in acute distress.     Appearance: Normal appearance.   Cardiovascular:      Rate and Rhythm: Normal rate and regular rhythm.      Pulses: Normal pulses.      Heart sounds: Murmur heard.      Comments: Mechanical click   Pulmonary:      Effort: Pulmonary effort is normal. No respiratory distress.      Breath sounds: No wheezing, rhonchi or rales.   Skin:     General: Skin is warm and dry.   Neurological:      Mental Status: She is alert.         RESULTS    PFTs:  3/4/2025: No airway obstruction.  No restriction.  Air trapping with residual volume of 219%.  Reduced DLCO that partially corrects for alveolar volume.    Imaging:   LDCT of the chest " "8/26/2024  Impression:  1. No significant pulmonary nodules or masses identified.  2. Postop changes of mitral valve replacement with coronary artery atherosclerotic calcifications and aneurysmal dilatation of the ascending aorta measuring up to 4.7 cm. Postop changes of prior transvenous pacemaker placement.    PROBLEM LIST    Problem List Items Addressed This Visit       Tobacco abuse (Last cig ~12/2020)    Bilateral solid and semisolid nodules - Primary    Relevant Medications    albuterol sulfate  (90 Base) MCG/ACT inhaler    Chronic anticoagulation (Warfarin)    Mitral valve disease (S/P MVR)     Other Visit Diagnoses       Shortness of breath        Relevant Medications    albuterol sulfate  (90 Base) MCG/ACT inhaler          DISCUSSION    Ms. Marquez was seen today for follow-up and is stable from pulmonary standpoint.    Ongoing electronic cigarette use.    Former tobacco use   Electronic cigarette use  Patient is a former cigarette smoker with ongoing electronic cigarette use  Complete cessation highly recommended- currently no cessation date is set   We did discuss smoking cessation methods today and the patient will contact me when ready to implement these changes   PFTs today unrevealing for obstruction or restriction but do show air trapping and a reduced DLCO.  Trend annually.  Does have a \"scooped out\" appearance of her flow volume loop which I do feel represents some COPD  Prior LDCT of the chest from August was unrevealing for suspicious pulmonary nodules- will repeat in 1 year due August 2025 (order in place)   Not on any maintenance inhalers.  I have ordered albuterol for her to use as needed every 4-6 hours for shortness of breath or wheezing.  PFTs at next visit  MM phenotype    4.7 cm ascending aortic aneurysm  S/p mechanical MVR  Followed by Dr. Mike  Chronically anticoagulated on warfarin and denies missed doses.    Left thyroid mass  Noted on most recent LDCT of the chest dating " back to 6/2022 imaging  Underwent FNA compatible with a benign follicular nodule  Has had multiple thyroid nodules in the past  Continue to follow with endocrinology    Return to clinic in 6 months after her CT of the chest or may see me sooner if needed.    Moderate level of Medical Decision Making complexity based on 2 or more chronic stable illnesses and prescription drug management.    I personally spent a total of 32 minutes on patient visit today including chart review, face to face with the patient obtaining the history and physical exam, review of pertinent images and tests, counseling and discussion and/or coordination of care as described above, and documentation.  Total time excludes time spent on other separate services such as performing procedures or test interpretation, if applicable.    Electronically signed by IVETT Maurice, 03/04/25, 3:45 PM EST.    Please note that portions of this note were completed with a voice recognition program.      CC: Margo Willingham MD

## 2025-03-05 ENCOUNTER — ANTICOAGULATION VISIT (OUTPATIENT)
Dept: PHARMACY | Facility: HOSPITAL | Age: 70
End: 2025-03-05
Payer: MEDICARE

## 2025-03-05 DIAGNOSIS — Z79.01 CHRONIC ANTICOAGULATION: Primary | ICD-10-CM

## 2025-03-05 DIAGNOSIS — I05.9 MITRAL VALVE DISEASE: ICD-10-CM

## 2025-03-05 PROBLEM — I10 PRIMARY HYPERTENSION: Status: ACTIVE | Noted: 2025-03-05

## 2025-03-05 PROBLEM — Z95.0 PRESENCE OF CARDIAC PACEMAKER: Status: ACTIVE | Noted: 2025-03-05

## 2025-03-05 PROBLEM — I49.5 SICK SINUS SYNDROME: Status: ACTIVE | Noted: 2025-03-05

## 2025-03-05 PROBLEM — E78.5 HYPERLIPIDEMIA LDL GOAL <55: Status: ACTIVE | Noted: 2025-03-05

## 2025-03-05 PROBLEM — I25.10 CORONARY ARTERY DISEASE INVOLVING NATIVE CORONARY ARTERY OF NATIVE HEART: Status: ACTIVE | Noted: 2025-03-05

## 2025-03-05 LAB — INR PPP: 2.8

## 2025-03-05 RX ORDER — ATORVASTATIN CALCIUM 40 MG/1
20 TABLET, FILM COATED ORAL DAILY
Qty: 90 TABLET | Refills: 0 | Status: SHIPPED | OUTPATIENT
Start: 2025-03-05

## 2025-03-05 NOTE — PROGRESS NOTES
Anticoagulation Clinic Progress Note  Indication: Mechanical Mitral Valve  Referring Provider: Ernst Mike MD  Initial Warfarin Start Date: ~2013   Planned Duration of Therapy: indefinite   Goal INR: 2.0-2.5  Current Drug Interactions: ASA, duloxetine, trazodone, raloxifene, bumetanide, turmeric  Other:     Diet: no more than 2 servings of GLV  Alcohol: No   Tobacco: Vape - 5 cartridges per week   OTC Pain Medication: No     Anticoagulation Clinic INR History:  Date 2/11 2/19 2/25 3/4               Total Weekly Dose 22.5 mg 27.5 mg 25 mg 25 mg               INR 2.0 3.5 2.0 2.8               Notes Rec'd 2/12  redx1                  Clinic Interview:  Tablet Strength: 5 mg, 3 mg tablets  Estimated OOP cost: [please send to registration if not already done]  Verbal Release: mailed out 2/12/25  Patient Contact Info: [phone]    Patient Findings    Negatives: Signs/symptoms of thrombosis, Signs/symptoms of bleeding, Laboratory test error suspected, Change in health, Change in alcohol use, Change in activity, Upcoming invasive procedure, Emergency department visit, Upcoming dental procedure, Missed doses, Extra doses, Change in medications, Change in diet/appetite, Hospital admission, Bruising, Other complaints   Comments: All findings negative per patient.     Plan:  1. INR was supratherapeutic yesterday at 2.8 (goal 2.0-2.5). Instructed Ms. Marquez to continue warfarin 2.5 mg daily except 5 mg Mon/Thurs/Sat until recheck.   New patient to [facility] clinic. She has been on warfarin for 10+ years and INR seems to be rather labile. Will monitor and adjust regimen.   2. Repeat INR in 1 week, 3.11.25  3. Verbal information provided over the phone. Rivka Marquez expresses understanding by teach back and has no further questions at this time.        Jessica Flanagan CPhT   11:40 EST 3/5/2025    IShilpi, PharmD, have reviewed the note in full and agree with the assessment and plan.  03/05/25  12:17 EST

## 2025-03-05 NOTE — ASSESSMENT & PLAN NOTE
The patient will continue atorvastatin 40 mg once a day, goal is to keep the LDL less than 55.  Orders:    Lipid Panel; Future    CBC & Differential; Future    Basic Metabolic Panel; Future

## 2025-03-05 NOTE — ASSESSMENT & PLAN NOTE
As below patient has a permanent pacemaker.  Orders:    Lipid Panel; Future    CBC & Differential; Future    Basic Metabolic Panel; Future

## 2025-03-05 NOTE — ASSESSMENT & PLAN NOTE
The patient will continue lisinopril 40 mg once a day, nifedipine XL 90 mg once a day and metoprolol succinate 100 mg once a day.    Orders:    Lipid Panel; Future    CBC & Differential; Future    Basic Metabolic Panel; Future

## 2025-03-05 NOTE — ASSESSMENT & PLAN NOTE
The patient on pacemaker checkups as advised.  Orders:    Lipid Panel; Future    CBC & Differential; Future    Basic Metabolic Panel; Future

## 2025-03-05 NOTE — ASSESSMENT & PLAN NOTE
Patient will need echocardiogram for evaluation of her mechanical mitral valve. Last echocardiogram was done in January 2024. Discussed with the patient in detail. She will continue her warfarin INR is therapeutic for now. She understands the goals. Will adjust patient INR.   Orders:    Lipid Panel; Future    CBC & Differential; Future    Basic Metabolic Panel; Future

## 2025-03-05 NOTE — PROGRESS NOTES
Cardiology Follow-Up Note     Name: Rivka Marquez  :   1955  PCP: Margo Willingham MD  Date:   2025  Department: [facility] CARDIOLOGY  [address]  [facility] [address]  Fax [phone]  Phone [phone]    Chief Complaint   Patient presents with    Coronary Artery Disease    Hypertension    Hyperlipidemia       Subjective     History of Present Illness  Rivka Marquez is a 69 y.o. female who presents today for routine 3-month follow-up and echocardiogram.  Patient has significant cardiac past medical history including coronary artery disease native vessel, mechanical mitral valve replacement chronically anticoagulated with warfarin, hypertension, hyperlipidemia, pulmonary embolism, sick sinus syndrome status post dual-chamber pacemaker.  The patient echocardiogram is unremarkable, the left atrial volume was normal.  Mechanical valve was fine.          Left heart cath #4 2014 EF 55%, coronary artery disease with patent left circumflex stenting.  Nondominant RCA has mid total occlusion with collaterals.  Mitral valve replacement 10/2013 mechanical valve  Status post dual-chamber pacemaker insertion by Dr. Mittal 10/2013  2D echo 2021 EF 60 to 65% mild TR  Renal artery duplex 2021 no evidence of renal artery stenosis    Current Outpatient Medications:     albuterol sulfate  (90 Base) MCG/ACT inhaler, Inhale 2 puffs Every 4 (Four) Hours As Needed for Wheezing., Disp: 18 g, Rfl: 11    ALPRAZolam (XANAX) 0.5 MG tablet, Take  by mouth., Disp: , Rfl:     aspirin (aspirin) 81 MG EC tablet, Take  by mouth Daily., Disp: , Rfl:     atorvastatin (LIPITOR) 40 MG tablet, Take 0.5 tablets by mouth Daily., Disp: 90 tablet, Rfl: 0    bumetanide (BUMEX) 2 MG tablet, Take 1 tablet by mouth Daily As Needed (if SOA or stomach distended or ankles swollen)., Disp: , Rfl:     DULoxetine (CYMBALTA) 60 MG capsule, Take 1 capsule by  "mouth Daily., Disp: , Rfl:     gabapentin (NEURONTIN) 300 MG capsule, Take 1-2 capsules by mouth. 2 tabs PM, 1 tab AM, Disp: , Rfl:     lisinopril (PRINIVIL,ZESTRIL) 40 MG tablet, Take 0.5 tablets by mouth Daily., Disp: , Rfl:     Magnesium Gluconate 550 MG tablet, Take 30 mg by mouth., Disp: , Rfl:     metoprolol succinate XL (TOPROL-XL) 100 MG 24 hr tablet, Take 1 tablet by mouth Daily., Disp: , Rfl:     NIFEdipine XL (PROCARDIA XL) 90 MG 24 hr tablet, Take 1 tablet by mouth Daily., Disp: , Rfl:     nitroglycerin (NITROSTAT) 0.4 MG SL tablet, Place 1 tablet under the tongue Every 5 (Five) Minutes As Needed for Chest Pain. Take no more than 3 doses in 15 minutes., Disp: , Rfl:     raloxifene (EVISTA) 60 MG tablet, Take 1 tablet by mouth Daily., Disp: , Rfl:     Turmeric 500 MG capsule, Take 1 capsule by mouth Daily., Disp: , Rfl:     vitamin D3 125 MCG (5000 UT) capsule capsule, Take 1 capsule by mouth Daily., Disp: , Rfl:     warfarin (COUMADIN) 5 MG tablet, Take 1 tablet by mouth Daily., Disp: , Rfl:     Objective     Vital Signs:  /62 (BP Location: Right arm, Patient Position: Sitting)   Pulse 74   Ht 152.4 cm (60\")   Wt 60.8 kg (134 lb)   BMI 26.17 kg/m²   Estimated body mass index is 26.17 kg/m² as calculated from the following:    Height as of this encounter: 152.4 cm (60\").    Weight as of this encounter: 60.8 kg (134 lb).             Vitals reviewed.   Constitutional:       Appearance: Normal and healthy appearance.   Eyes:      Pupils: Pupils are equal, round, and reactive to light.   Pulmonary:      Effort: Pulmonary effort is normal.   Chest:      Chest wall: Not tender to palpatation.   Cardiovascular:      PMI at left midclavicular line. Normal rate. Regular rhythm.      No gallop.       Comments: Positive mechanical click  Pulses:     Intact distal pulses.   Edema:     Peripheral edema absent.   Skin:     General: Skin is warm.   Psychiatric:         Behavior: Behavior is cooperative. "              Data Review:   Lab Results   Component Value Date    GLUCOSE 99 11/22/2024    BUN 18 11/22/2024    CREATININE 0.96 11/22/2024    EGFRIFNONA >60 12/31/2021    EGFRIFAFRI >60 12/31/2021    BCR 18.8 11/22/2024    K 3.9 11/22/2024    CO2 28.1 11/22/2024    CALCIUM 10.6 (H) 11/22/2024    ALBUMIN 3.8 11/22/2024    AST 17 11/22/2024    ALT 16 11/22/2024     Lab Results   Component Value Date    CHLPL 123 01/01/2022    TRIG 152 (H) 01/01/2022    HDL 44 (L) 01/01/2022    LDL 48.6 01/01/2022      Lab Results   Component Value Date    WBC 7.41 12/31/2021    RBC 4.36 12/31/2021    HGB 13.3 12/31/2021    HCT 40.9 12/31/2021    MCV 94 12/31/2021     12/31/2021     Lab Results   Component Value Date    TSH 1.230 11/22/2024     Lab Results   Component Value Date    HGBA1C 5.6 12/31/2021     Lab Results   Component Value Date    INR 2.80 03/04/2025    INR 2.00 02/25/2025    INR 3.50 02/18/2025        Adult Transthoracic Echo Complete W/ Cont if Necessary Per Protocol (02/14/2025 13:56)     Assessment and Plan     Assessment & Plan  Coronary artery disease involving native coronary artery of native heart, unspecified whether angina present  The patient will continue aspirin 81 mg once a day along with the warfarin.  Notify if develop any symptoms of angina.    Orders:    Lipid Panel; Future    CBC & Differential; Future    Basic Metabolic Panel; Future    Primary hypertension  The patient will continue lisinopril 40 mg once a day, nifedipine XL 90 mg once a day and metoprolol succinate 100 mg once a day.    Orders:    Lipid Panel; Future    CBC & Differential; Future    Basic Metabolic Panel; Future    Hyperlipidemia LDL goal <55     The patient will continue atorvastatin 40 mg once a day, goal is to keep the LDL less than 55.  Orders:    Lipid Panel; Future    CBC & Differential; Future    Basic Metabolic Panel; Future    Sick sinus syndrome  As below patient has a permanent pacemaker.  Orders:    Lipid Panel;  Future    CBC & Differential; Future    Basic Metabolic Panel; Future    Presence of cardiac pacemaker  The patient on pacemaker checkups as advised.  Orders:    Lipid Panel; Future    CBC & Differential; Future    Basic Metabolic Panel; Future    Mitral valve disease (S/P MVR)  Patient will need echocardiogram for evaluation of her mechanical mitral valve. Last echocardiogram was done in January 2024. Discussed with the patient in detail. She will continue her warfarin INR is therapeutic for now. She understands the goals. Will adjust patient INR.   Orders:    Lipid Panel; Future    CBC & Differential; Future    Basic Metabolic Panel; Future      Advised to continue current cardiac medications. Please notify of any issues. Discussed with the patient compliance with medical management and follow-up.     Follow Up  Return in about 3 months (around 6/6/2025).    Call if you have any significant symptoms or go to the [facility] Emergency room if possible.     Ernst Mike MD, FACC,[facility].  [facility]    Part of this note may be an electronic transcription/translation of spoken language to printed text using the Dragon Dictation System.

## 2025-03-05 NOTE — ASSESSMENT & PLAN NOTE
The patient will continue aspirin 81 mg once a day along with the warfarin.  Notify if develop any symptoms of angina.    Orders:    Lipid Panel; Future    CBC & Differential; Future    Basic Metabolic Panel; Future

## 2025-03-06 ENCOUNTER — OFFICE VISIT (OUTPATIENT)
Age: 70
End: 2025-03-06
Payer: MEDICARE

## 2025-03-06 VITALS
DIASTOLIC BLOOD PRESSURE: 62 MMHG | HEART RATE: 74 BPM | BODY MASS INDEX: 26.31 KG/M2 | SYSTOLIC BLOOD PRESSURE: 114 MMHG | WEIGHT: 134 LBS | HEIGHT: 60 IN

## 2025-03-06 DIAGNOSIS — Z95.0 PRESENCE OF CARDIAC PACEMAKER: ICD-10-CM

## 2025-03-06 DIAGNOSIS — I49.5 SICK SINUS SYNDROME: ICD-10-CM

## 2025-03-06 DIAGNOSIS — I25.10 CORONARY ARTERY DISEASE INVOLVING NATIVE CORONARY ARTERY OF NATIVE HEART, UNSPECIFIED WHETHER ANGINA PRESENT: Primary | ICD-10-CM

## 2025-03-06 DIAGNOSIS — I10 PRIMARY HYPERTENSION: ICD-10-CM

## 2025-03-06 DIAGNOSIS — E78.5 HYPERLIPIDEMIA LDL GOAL <55: ICD-10-CM

## 2025-03-06 DIAGNOSIS — I05.9 MITRAL VALVE DISEASE: ICD-10-CM

## 2025-03-06 RX ORDER — NITROGLYCERIN 0.4 MG/1
0.4 TABLET SUBLINGUAL
COMMUNITY

## 2025-03-10 RX ORDER — METOPROLOL SUCCINATE 100 MG/1
TABLET, EXTENDED RELEASE ORAL
Qty: 90 TABLET | Refills: 1 | Status: SHIPPED | OUTPATIENT
Start: 2025-03-10

## 2025-03-10 NOTE — TELEPHONE ENCOUNTER
Rx Refill Note  Requested Prescriptions     Pending Prescriptions Disp Refills    metoprolol succinate XL (TOPROL-XL) 100 MG 24 hr tablet [Pharmacy Med Name: Metoprolol Succinate ER Oral Tablet Extended Release 24 Hour 100 MG] 90 tablet 3     Sig: TAKE 1 TABLET ONE TIME DAILY      Last office visit with prescribing clinician: 3/6/2025   Last telemedicine visit with prescribing clinician: Visit date not found   Next office visit with prescribing clinician: 6/6/2025                        Would you like a call back once the refill request has been completed: [] Yes [x] No [] N/A    If the office needs to give you a call back, can they leave a voicemail: [] Yes [x] No [] N/A    Pharmacy Info    Last Fill Date: NA  Rx Written Date: NA  Prescribed Qty: 90  Additional Details from Pharmacy: NA    PATIENT PASSED PROTOCOLS PER DEVON Wheeler MA  03/10/25, 08:10 EDT

## 2025-03-18 ENCOUNTER — TELEPHONE (OUTPATIENT)
Dept: PHARMACY | Facility: HOSPITAL | Age: 70
End: 2025-03-18

## 2025-03-18 NOTE — TELEPHONE ENCOUNTER
Patient called and reports she is waiting on more testing supplies to arrive. She says she will call the clinic when she is able to test again.    Jessica Flanagan CPhT   12:20 EDT   3/18/2025

## 2025-03-19 LAB — INR PPP: 2.3

## 2025-03-20 ENCOUNTER — ANTICOAGULATION VISIT (OUTPATIENT)
Dept: PHARMACY | Facility: HOSPITAL | Age: 70
End: 2025-03-20
Payer: MEDICARE

## 2025-03-20 DIAGNOSIS — I05.9 MITRAL VALVE DISEASE: ICD-10-CM

## 2025-03-20 DIAGNOSIS — Z79.01 CHRONIC ANTICOAGULATION: Primary | ICD-10-CM

## 2025-03-20 NOTE — PROGRESS NOTES
Anticoagulation Clinic Progress Note  Indication: Mechanical Mitral Valve  Referring Provider: Ernst Mike MD  Initial Warfarin Start Date: ~2013   Planned Duration of Therapy: indefinite   Goal INR: 2.0-2.5  Current Drug Interactions: ASA, duloxetine, trazodone, raloxifene, bumetanide, turmeric  Other:     Diet: no more than 2 servings of GLV  Alcohol: No   Tobacco: Vape - 5 cartridges per week   OTC Pain Medication: No     Anticoagulation Clinic INR History:  Date 2/11 2/19 2/25 3/4 3/19              Total Weekly Dose 22.5 mg 27.5 mg 25 mg 25 mg 25 mg               INR 2.0 3.5 2.0 2.8 2.3              Notes Rec'd 2/12  red                  Clinic Interview:  Tablet Strength: 5 mg, 3 mg tablets  Estimated OOP cost: [please send to registration if not already done]  Verbal Release: mailed out 2/12/25  Patient Contact Info: [phone]      Patient Findings:  Negatives: Signs/symptoms of thrombosis, Signs/symptoms of bleeding, Laboratory test error suspected, Change in health, Change in alcohol use, Change in activity, Upcoming invasive procedure, Emergency department visit, Upcoming dental procedure, Missed doses, Extra doses, Change in medications, Change in diet/appetite, Hospital admission, Bruising, Other complaints   Comments: All findings negative per patient.       Plan:  1. INR was therapeutic yesterday at 2.3 (goal 2.0-2.5). Instructed Ms. Marquez to continue warfarin 2.5 mg daily except 5 mg Mon/Thurs/Sat until recheck.   New patient to [facility] clinic. She has been on warfarin for 10+ years and INR seems to be rather labile. Will monitor and adjust regimen.   2. Repeat INR in 1 week, 3.26.25  3. Verbal information provided over the phone. Rivka Marquez expresses understanding by teach back and has no further questions at this time.      Jacquelyn Lau, PharmD   3/20/2025  10:54 EDT

## 2025-03-25 ENCOUNTER — ANTICOAGULATION VISIT (OUTPATIENT)
Dept: PHARMACY | Facility: HOSPITAL | Age: 70
End: 2025-03-25
Payer: MEDICARE

## 2025-03-25 DIAGNOSIS — I05.9 MITRAL VALVE DISEASE: ICD-10-CM

## 2025-03-25 DIAGNOSIS — Z79.01 CHRONIC ANTICOAGULATION: Primary | ICD-10-CM

## 2025-03-25 LAB — INR PPP: 2.2

## 2025-03-25 NOTE — PROGRESS NOTES
Anticoagulation Clinic Progress Note  Indication: Mechanical Mitral Valve  Referring Provider: Ernst Mike MD  Initial Warfarin Start Date: ~2013   Planned Duration of Therapy: indefinite   Goal INR: 2.0-2.5  Current Drug Interactions: ASA, duloxetine, trazodone, raloxifene, bumetanide, turmeric  Other:     Diet: no more than 2 servings of GLV  Alcohol: No   Tobacco: Vape - 5 cartridges per week   OTC Pain Medication: No     Anticoagulation Clinic INR History:  Date 2/11 2/19 2/25 3/4 3/19 3/25             Total Weekly Dose 22.5 mg 27.5 mg 25 mg 25 mg 25 mg  25 mg             INR 2.0 3.5 2.0 2.8 2.3 2.2             Notes Rec'd 2/12  redx1                  Clinic Interview:  Tablet Strength: 5 mg, 3 mg tablets  Estimated OOP cost: [please send to registration if not already done]  Verbal Release: mailed out 2/12/25  Patient Contact Info: [phone]    Patient Findings    Negatives: Signs/symptoms of thrombosis, Signs/symptoms of bleeding, Laboratory test error suspected, Change in health, Change in alcohol use, Change in activity, Upcoming invasive procedure, Emergency department visit, Upcoming dental procedure, Missed doses, Extra doses, Change in medications, Change in diet/appetite, Hospital admission, Bruising, Other complaints   Comments: All findings negative per patient.     Plan:  1. INR therapeutic today at 2.2 (goal 2.0-2.5). Instructed Ms. Marquez to continue warfarin 2.5 mg daily except 5 mg Mon/Thurs/Sat until recheck.   New patient to [facility] clinic. She has been on warfarin for 10+ years and INR seems to be rather labile. Will monitor and adjust regimen.   2. Repeat INR in 1 week, 4.01.25  3. Verbal information provided over the phone. Rikva Marquez expresses understanding by teach back and has no further questions at this time.      Jessica Flanagan CPhT, [facility]   13:57 EDT   3/25/2025    Shilpi SKINNER, PharmD, have reviewed the note in full and agree with the assessment and  plan.  03/25/25  14:26 EDT

## 2025-04-02 ENCOUNTER — ANTICOAGULATION VISIT (OUTPATIENT)
Dept: PHARMACY | Facility: HOSPITAL | Age: 70
End: 2025-04-02
Payer: MEDICARE

## 2025-04-02 DIAGNOSIS — I05.9 MITRAL VALVE DISEASE: ICD-10-CM

## 2025-04-02 DIAGNOSIS — Z79.01 CHRONIC ANTICOAGULATION: Primary | ICD-10-CM

## 2025-04-02 LAB — INR PPP: 2.7

## 2025-04-02 RX ORDER — WARFARIN SODIUM 5 MG/1
5 TABLET ORAL
Qty: 45 TABLET | Refills: 2 | Status: SHIPPED | OUTPATIENT
Start: 2025-04-02

## 2025-04-02 NOTE — PROGRESS NOTES
Anticoagulation Clinic Progress Note  Indication: Mechanical Mitral Valve  Referring Provider: Ernst Mike MD  Initial Warfarin Start Date: ~2013   Planned Duration of Therapy: indefinite   Goal INR: 2.0-2.5  Current Drug Interactions: ASA, duloxetine, trazodone, raloxifene, bumetanide, turmeric  Other:     Diet: no more than 2 servings of GLV  Alcohol: No   Tobacco: Vape - 5 cartridges per week   OTC Pain Medication: No     Anticoagulation Clinic INR History:  Date 2/11 2/19 2/25 3/4 3/19 3/25 4/1            Total Weekly Dose 22.5 mg 27.5 mg 25 mg 25 mg 25 mg  25 mg 25 mg            INR 2.0 3.5 2.0 2.8 2.3 2.2 2.7            Notes Rec'd 2/12  redx1    Rec'd 4/2              Clinic Interview:  Tablet Strength: 5 mg, 3 mg tablets  Estimated OOP cost: [please send to registration if not already done]  Verbal Release: mailed out 2/12/25  Patient Contact Info: [phone]      Patient Findings:  Negatives: Signs/symptoms of thrombosis, Signs/symptoms of bleeding, Laboratory test error suspected, Change in health, Change in alcohol use, Change in activity, Upcoming invasive procedure, Emergency department visit, Upcoming dental procedure, Missed doses, Extra doses, Change in medications, Change in diet/appetite, Hospital admission, Bruising, Other complaints   Comments: All findings negative per patient.       Plan:  INR was supratherapeutic yesterday at 2.7 (goal 2.0-2.5). Instructed Ms. Marquez to continue warfarin 2.5 mg daily except 5 mg Mon/Thurs/Sat until recheck. Patient will have an extra serving of GLV of her choice.   Repeat INR in 1 week, 4.08.25  Verbal information provided over the phone. Rivka Marquez expresses understanding by teach back and has no further questions at this time.      Jessica Flanagan CPhT, [facility]   09:42 EDT   4/2/2025    Jacquelyn SKINNER, PharmD, have reviewed the note in full and agree with the assessment and plan.  04/02/25  09:58 EDT

## 2025-04-07 ENCOUNTER — CLINICAL SUPPORT NO REQUIREMENTS (OUTPATIENT)
Age: 70
End: 2025-04-07
Payer: MEDICARE

## 2025-04-09 ENCOUNTER — ANTICOAGULATION VISIT (OUTPATIENT)
Dept: PHARMACY | Facility: HOSPITAL | Age: 70
End: 2025-04-09
Payer: MEDICARE

## 2025-04-09 DIAGNOSIS — Z79.01 CHRONIC ANTICOAGULATION: Primary | ICD-10-CM

## 2025-04-09 DIAGNOSIS — I05.9 MITRAL VALVE DISEASE: ICD-10-CM

## 2025-04-09 LAB — INR PPP: 2.5

## 2025-04-09 NOTE — PROGRESS NOTES
Anticoagulation Clinic Progress Note  Indication: Mechanical Mitral Valve  Referring Provider: Ernst Mike MD  Initial Warfarin Start Date: ~2013   Planned Duration of Therapy: indefinite   Goal INR: 2.0-2.5  Current Drug Interactions: ASA, duloxetine, trazodone, raloxifene, bumetanide, turmeric  Other:     Diet: no more than 2 servings of GLV  Alcohol: No   Tobacco: Vape - 5 cartridges per week   OTC Pain Medication: No     Anticoagulation Clinic INR History:  Date 2/11 2/19 2/25 3/4 3/19 3/25 4/1 4/8           Total Weekly Dose 22.5 mg 27.5 mg 25 mg 25 mg 25 mg  25 mg 25 mg 25 mg           INR 2.0 3.5 2.0 2.8 2.3 2.2 2.7 2.5           Notes Rec'd 2/12  redx1    Rec'd 4/2 Rec'd 4/9             Clinic Interview:  Tablet Strength: 5 mg, 3 mg tablets  Estimated OOP cost: [please send to registration if not already done]  Verbal Release: mailed out 2/12/25  Patient Contact Info: [phone]      Patient Findings:  Negatives: Signs/symptoms of thrombosis, Signs/symptoms of bleeding, Laboratory test error suspected, Change in health, Change in alcohol use, Change in activity, Upcoming invasive procedure, Emergency department visit, Upcoming dental procedure, Missed doses, Extra doses, Change in medications, Change in diet/appetite, Hospital admission, Bruising, Other complaints   Comments: All findings negative per patient.     Plan:  INR was therapeutic yesterday at 2.5 (goal 2.0-2.5). Instructed Ms. Marquez to continue warfarin 2.5 mg daily except 5 mg Mon/Thurs/Sat until recheck.   Repeat INR in 1 week, 4.15.25  Verbal information provided over the phone. Rivka Marquez expresses understanding by teach back and has no further questions at this time.      Jessica Flanagan CPhT, [facility]   09:17 EDT   4/9/2025      Jacquelyn SKINNER, PharmD, have reviewed the note in full and agree with the assessment and plan.  04/09/25  10:31 EDT

## 2025-04-10 RX ORDER — NIFEDIPINE 90 MG/1
TABLET, EXTENDED RELEASE ORAL
Qty: 90 TABLET | Refills: 3 | Status: SHIPPED | OUTPATIENT
Start: 2025-04-10

## 2025-04-10 NOTE — TELEPHONE ENCOUNTER
Rx Refill Note  Requested Prescriptions     Pending Prescriptions Disp Refills    NIFEdipine XL (PROCARDIA XL) 90 MG 24 hr tablet [Pharmacy Med Name: NIFEdipine ER Osmotic Release Oral Tablet Extended Release 24 Hour 90 MG] 90 tablet 3     Sig: TAKE 1 TABLET ONE TIME DAILY      Last office visit with prescribing clinician: 3/6/2025   Last telemedicine visit with prescribing clinician: Visit date not found   Next office visit with prescribing clinician: 6/6/2025                        Pharmacy Info    Last Fill Date:  Rx Written Date:   Prescribed Qty:   Additional Details from Pharmacy:    Patient passed protocols per lacy.         Margie Wheeler MA  04/10/25, 08:06 EDT

## 2025-04-15 ENCOUNTER — ANTICOAGULATION VISIT (OUTPATIENT)
Dept: PHARMACY | Facility: HOSPITAL | Age: 70
End: 2025-04-15
Payer: MEDICARE

## 2025-04-15 DIAGNOSIS — Z79.01 CHRONIC ANTICOAGULATION: Primary | ICD-10-CM

## 2025-04-15 DIAGNOSIS — I05.9 MITRAL VALVE DISEASE: ICD-10-CM

## 2025-04-15 LAB — INR PPP: 2.8

## 2025-04-23 ENCOUNTER — ANTICOAGULATION VISIT (OUTPATIENT)
Dept: PHARMACY | Facility: HOSPITAL | Age: 70
End: 2025-04-23
Payer: MEDICARE

## 2025-04-23 DIAGNOSIS — Z79.01 CHRONIC ANTICOAGULATION: Primary | ICD-10-CM

## 2025-04-23 DIAGNOSIS — I05.9 MITRAL VALVE DISEASE: ICD-10-CM

## 2025-04-23 LAB — INR PPP: 2.4

## 2025-04-23 NOTE — PROGRESS NOTES
"[facility] Anticoagulation Clinic Progress Note     Patient Demographics    Method of INR reporting: JOY HOME MONITOR  Estimated OOP Cost:    Indication:  Mechanical valve replacement  Referring Provider Ernst Mike MD  Reason patient is not on a DOAC: Unknown   Goal INR:  2-2.5  Warfarin Start Date ~ 2013  Reason patient is not on home monitor: N/A   [id number]: N/A  Planned Duration of Therapy Indefinite   Relevant medical history:    Bleed Risk/History: No history of bleed  Tablets Strength: 5 mg (peach) and 3 mg (brown)        Anticoagulation Clinic INR History    Date 2/11 2/19 2/25 3/4 3/19 3/25 4/1 4/8 4/15 4/22         Total Weekly Dose 22.5 mg 27.5 mg 25 mg 25 mg 25 mg  25 mg 25 mg 25 mg 25 mg 25 mg          INR 2.0 3.5 2.0 2.8 2.3 2.2 2.7 2.5 2.8 2.4         Notes Rec'd 2/12  redx1    Rec'd 4/2 Rec'd 4/9  Rec'd 4/23           Patient Contact Information  Verbal release: Not obtained, mailing out along with CCA and [employer] 4.23.25    Preferred contact number: [phone]  Alternative contact number(s): [phone]  Patient Appropriate for \"WarfNoCall\"? No   Preferred contact name: Rivka Marquez  Alternative contact name(s): [facility] (relative)      Preferred contact relation: Self  Lab contact information (if applicable):           Subjective Findings    Drug Interactions  Dietary Findings    Historical: Aspirin, duloxetine, trazodone, raloxifene, bumetanide, turmeric  Historical GLV intake (date updated): no more than 2 servings of GLV weekly    New (including OTC): None  GLV changes this encounter: None     Alcohol and Tobacco   Historical: Vapes 5 cartridges weekly    New: None       Patient Findings Narrative:    Patient Findings    Negatives: Signs/symptoms of thrombosis, Signs/symptoms of bleeding, Laboratory test error suspected, Change in health, Change in alcohol use, Change in activity, Upcoming invasive procedure, Emergency department visit, Upcoming dental procedure, " Missed doses, Extra doses, Change in medications, Change in diet/appetite, Hospital admission, Bruising, Other complaints   Comments: All findings negative per patient.     Assessment and Plan:    INR was therapeutic yesterday at 2.4. Instructed patient to continue warfarin 2.5 mg daily except 5 mg Mon/Thurs/Sat until recheck  Recheck INR in 1 week, 4.29.25  Verbal and written information provided. Rivka Marquez expresses understanding by teach back and has no further questions at this time.      Jessica Flanagan CPhT, [facility]   10:41 EDT   4/23/2025    IPatrick, PharmD, have reviewed the note in full and agree with the assessment and plan.  04/23/25  12:10 EDT

## 2025-04-29 ENCOUNTER — ANTICOAGULATION VISIT (OUTPATIENT)
Dept: PHARMACY | Facility: HOSPITAL | Age: 70
End: 2025-04-29
Payer: MEDICARE

## 2025-04-29 DIAGNOSIS — Z79.01 CHRONIC ANTICOAGULATION: Primary | ICD-10-CM

## 2025-04-29 DIAGNOSIS — I05.9 MITRAL VALVE DISEASE: ICD-10-CM

## 2025-04-29 LAB — INR PPP: 2.9

## 2025-05-07 ENCOUNTER — ANTICOAGULATION VISIT (OUTPATIENT)
Dept: PHARMACY | Facility: HOSPITAL | Age: 70
End: 2025-05-07
Payer: MEDICARE

## 2025-05-07 DIAGNOSIS — Z79.01 CHRONIC ANTICOAGULATION: Primary | ICD-10-CM

## 2025-05-07 DIAGNOSIS — I05.9 MITRAL VALVE DISEASE: ICD-10-CM

## 2025-05-07 LAB — INR PPP: 2.6

## 2025-05-07 NOTE — PROGRESS NOTES
" Anticoagulation Clinic Progress Note     Patient Demographics    Method of INR reporting: JOY HOME MONITOR  Estimated OOP Cost:    Indication:  Mechanical MVR  Referring Provider Ernst Mike MD  Reason patient is not on a DOAC: Unknown   Goal INR:  2-2.5  Warfarin Start Date ~ 2013  Reason patient is not on home monitor: N/A   [id number]: N/A  Planned Duration of Therapy Indefinite   Relevant medical history:    Bleed Risk/History: No history of bleed  Tablets Strength: 5 mg (peach) and 3 mg (brown)        Anticoagulation Clinic INR History    Date 2/11 2/19 2/25 3/4 3/19 3/25 4/1 4/8 4/15 4/22 4/29 5/7       Total Weekly Dose 22.5 mg 27.5 mg 25 mg 25 mg 25 mg  25 mg 25 mg 25 mg 25 mg 25 mg  25 mg  25 mg        INR 2.0 3.5 2.0 2.8 2.3 2.2 2.7 2.5 2.8 2.4 2.9 2.6       Notes Rec'd 2/12  redx1    Rec'd 4/2 Rec'd 4/9  Rec'd 4/23  Spoke 5/8         Patient Contact Information  Verbal release: Not obtained, mailing out along with CCA and R[employer] 4.23.25    Preferred contact number: [phone]  Alternative contact number(s): [phone]  Patient Appropriate for \"WarfNoCall\"? No   Preferred contact name: Rivka Marquez  Alternative contact name(s): [facility] (relative)      Preferred contact relation: Self  Lab contact information (if applicable):           Subjective Findings    Drug Interactions  Dietary Findings    Historical: Aspirin, duloxetine, trazodone, raloxifene, bumetanide, turmeric  Historical GLV intake (date updated): no more than 2 servings of GLV weekly    New (including OTC): None  GLV changes this encounter: None     Alcohol and Tobacco   Historical: Vapes 5 cartridges weekly    New: None       Patient Findings Narrative:    Negatives: Signs/symptoms of thrombosis, Signs/symptoms of bleeding, Laboratory test error suspected, Change in health, Change in alcohol use, Change in activity, Upcoming invasive procedure, Emergency department visit, Upcoming dental procedure, " Missed doses, Extra doses, Change in medications, Change in diet/appetite, Hospital admission, Bruising, Other complaints   Comments: All findings negative per patient.       Assessment and Plan:    INR was slightly supratherapeutic yesterday at 2.6. Instructed patient to continue warfarin 2.5 mg daily except 5 mg Mon/Thurs/Sat until recheck. Patient will have an extra serving of GLV of her choice.   Recheck INR 5/13/25  Verbal and written information provided. Rivka Marquez expresses understanding by teach back and has no further questions at this time.      Jessica Flanagan [facility], [facility]   15:04 EDT   5/8/2025    ITrinh, [facility], have reviewed the note in full and agree with the assessment and plan.  05/08/25  15:12 EDT

## 2025-05-13 ENCOUNTER — ANTICOAGULATION VISIT (OUTPATIENT)
Dept: PHARMACY | Facility: HOSPITAL | Age: 70
End: 2025-05-13
Payer: MEDICARE

## 2025-05-13 DIAGNOSIS — I05.9 MITRAL VALVE DISEASE: ICD-10-CM

## 2025-05-13 DIAGNOSIS — Z79.01 CHRONIC ANTICOAGULATION: Primary | ICD-10-CM

## 2025-05-13 LAB — INR PPP: 3.1

## 2025-05-13 NOTE — PROGRESS NOTES
"[facility] Anticoagulation Clinic Progress Note     Patient Demographics    Method of INR reporting: JOY HOME MONITOR  Estimated OOP Cost:    Indication:  Mechanical MVR  Referring Provider Ernst Mike MD  Reason patient is not on a DOAC: Unknown   Goal INR:  2-2.5  Warfarin Start Date ~ 2013  Reason patient is not on home monitor: N/A   [id number]: N/A  Planned Duration of Therapy Indefinite   Relevant medical history:    Bleed Risk/History: No history of bleed  Tablets Strength: 5 mg (peach) and 3 mg (brown)        Anticoagulation Clinic INR History    Date 2/11 2/19 2/25 3/4 3/19 3/25 4/1 4/8 4/15 4/22 4/29 5/7 5/13      Total Weekly Dose 22.5 mg 27.5 mg 25 mg 25 mg 25 mg  25 mg 25 mg 25 mg 25 mg 25 mg  25 mg  25 mg  25 mg       INR 2.0 3.5 2.0 2.8 2.3 2.2 2.7 2.5 2.8 2.4 2.9 2.6 3.1      Notes Rec'd 2/12  redx1    Rec'd 4/2 Rec'd 4/9  Rec'd 4/23  Spoke 5/8         Patient Contact Information  Verbal release: Not obtained, mailing out along with CCA and R&R 4.23.25    Preferred contact number: [phone]  Alternative contact number(s): [phone]  Patient Appropriate for \"WarfNoCall\"? No   Preferred contact name: Rivka Marquez  Alternative contact name(s): [facility] (relative)      Preferred contact relation: Self  Lab contact information (if applicable):           Subjective Findings    Drug Interactions  Dietary Findings    Historical: Aspirin, duloxetine, trazodone, raloxifene, bumetanide, turmeric  Historical GLV intake (date updated): no more than 2 servings of GLV weekly    New (including OTC): None  GLV changes this encounter: None     Alcohol and Tobacco   Historical: Vapes 5 cartridges weekly    New: None       Patient Findings Narrative:    Negatives: Signs/symptoms of thrombosis, Signs/symptoms of bleeding, Laboratory test error suspected, Change in health, Change in alcohol use, Change in activity, Upcoming invasive procedure, Emergency department visit, Upcoming dental " procedure, Missed doses, Extra doses, Change in medications, Change in diet/appetite, Hospital admission, Bruising, Other complaints   Comments: All findings negative per patient.       Assessment and Plan:    INR is supratherapeutic at 3.1. Per Shilpi Pablo PharmD instructed patient to take warfarin 2.5 mg daily except 5 mg Mon/Thurs until recheck.   Recheck INR in 1 week, 5.20.25  Verbal and written information provided. Rivka Marquez expresses understanding by teach back and has no further questions at this time.      Jessica Flanagan CPhT, [facility]   16:10 EDT   5/13/2025    I, Shilpi Pablo, Abigail, have reviewed the note in full and agree with the assessment and plan.  05/13/25  16:11 EDT

## 2025-05-21 ENCOUNTER — ANTICOAGULATION VISIT (OUTPATIENT)
Dept: PHARMACY | Facility: HOSPITAL | Age: 70
End: 2025-05-21
Payer: MEDICARE

## 2025-05-21 DIAGNOSIS — Z79.01 CHRONIC ANTICOAGULATION: Primary | ICD-10-CM

## 2025-05-21 DIAGNOSIS — I05.9 MITRAL VALVE DISEASE: ICD-10-CM

## 2025-05-21 LAB — INR PPP: 2.2

## 2025-05-21 NOTE — PROGRESS NOTES
"[facility] Anticoagulation Clinic Progress Note     Patient Demographics    Method of INR reporting: JOY HOME MONITOR  Estimated OOP Cost:    Indication:  Mechanical MVR  Referring Provider Ernst Mike MD  Reason patient is not on a DOAC: Unknown   Goal INR:  2-2.5  Warfarin Start Date ~ 2013  Reason patient is not on home monitor: N/A   [id number]: N/A  Planned Duration of Therapy Indefinite   Relevant medical history:    Bleed Risk/History: No history of bleed  Tablets Strength: 5 mg (peach) and 3 mg (brown)        Anticoagulation Clinic INR History    Date 2/11 2/19 2/25 3/4 3/19 3/25 4/1 4/8 4/15 4/22 4/29 5/7 5/13 5/20     Total Weekly Dose 22.5 mg 27.5 mg 25 mg 25 mg 25 mg  25 mg 25 mg 25 mg 25 mg 25 mg  25 mg  25 mg  25 mg  22.5 mg     INR 2.0 3.5 2.0 2.8 2.3 2.2 2.7 2.5 2.8 2.4 2.9 2.6 3.1 2.2     Notes Rec'd 2/12  redx1    Rec'd 4/2 Rec'd 4/9  Rec'd 4/23  Spoke 5/8  Rec'd 5/21       Patient Contact Information  Verbal release: Not obtained, mailing out along with CCA and R&R 5.22.25    Preferred contact number: [phone]  Alternative contact number(s): [phone]  Patient Appropriate for \"WarfNoCall\"? No   Preferred contact name: Rivka Marquez  Alternative contact name(s): [facility] (relative)      Preferred contact relation: Self  Lab contact information (if applicable):           Subjective Findings    Drug Interactions  Dietary Findings    Historical: Aspirin, duloxetine, trazodone, raloxifene, bumetanide, turmeric  Historical GLV intake (date updated): no more than 2 servings of GLV weekly    New (including OTC): None  GLV changes this encounter: None     Alcohol and Tobacco   Historical: Vapes 5 cartridges weekly    New: None       Patient Findings Narrative:      Negatives: Signs/symptoms of thrombosis, Signs/symptoms of bleeding, Laboratory test error suspected, Change in health, Change in alcohol use, Change in activity, Upcoming invasive procedure, Emergency department " visit, Upcoming dental procedure, Missed doses, Extra doses, Change in medications, Change in diet/appetite, Hospital admission, Bruising, Other complaints   Comments: All findings negative per patient.       Assessment and Plan:    INR was therapeutic yesterday at 2.2. Instructed patient to continue warfarin 2.5 mg daily except 5 mg Mon/Thurs until recheck.   Recheck INR 5.28.25  Verbal and written information provided. Rivka Marquez expresses understanding by teach back and has no further questions at this time.      Jessica Flanagan CPhT, [facility]   09:47 EDT   5/21/2025    IShilpi, PharmD, have reviewed the note in full and agree with the assessment and plan.  05/21/25  09:51 EDT

## 2025-05-21 NOTE — PROGRESS NOTES
Chief complaint/Reason for consult: Thyroid nodules and hypercalcemia    HPI: Patient is a 69 year old female here for follow-up of thyroid nodules.  She was last seen 11/22/2024 and reports since that time no changes in health.     # Thyroid nodule:   - First noted ~12 years ago   - Last thyroid ultrasound: 11/22/2024 showing a 1.46 cm right mid lobe nodule, 1.21 cm isthmus nodule and 4.02 cm left mid thyroid lobe nodule  - Prior FNA's: 11/22/2024 consistent with benign follicular nodule  - Denies dysphagia   - Has occasional weak voice  - Has some dyspnea that she thinks is cardiac related   - Denies history of radiation to the head/neck  - No known family history of thyroid cancer  - Has a history of tobacco use (~35 pack year history, quit in 2013 but now vapes)      # Hypercalcemia  - Labs 11/22/2024:    CMP with total calcium 10.6 and albumin 3.8   PTH 57.8  Cr 0.96  TSH 1.230 and FT4 1.23  25-OH Vitamin D 64.3    - Patient does not take a thiazide diuretic  - Reports calcium intake through diet and supplement   - Reports vitamin D intake of 1,000 IU daily   - No known kidney stones  - Has known osteoporosis, on raloxifene  - DXA done 8/27/2024 with T-scores as follows:  L1-L4 -1.0; -2.6 on 7/16/2019  Left femoral neck -2.7  Left total femur -1.6       Review of Systems   Constitutional:  Negative for activity change and unexpected weight change.   HENT:  Positive for voice change. Negative for trouble swallowing.    Eyes:  Negative for visual disturbance.   Respiratory:  Positive for cough and shortness of breath.    Cardiovascular:  Negative for chest pain.   Gastrointestinal:  Negative for abdominal pain.   Endocrine: Negative for cold intolerance and heat intolerance.   Musculoskeletal:  Positive for arthralgias and back pain.   Skin:  Negative for rash.   Neurological:  Negative for numbness.   Psychiatric/Behavioral:  Negative for agitation and confusion.         Physical Exam  Vitals reviewed.    Constitutional:       General: She is not in acute distress.  HENT:      Head: Normocephalic.      Nose: Nose normal.   Eyes:      Conjunctiva/sclera: Conjunctivae normal.   Cardiovascular:      Rate and Rhythm: Normal rate.      Pulses: Normal pulses.   Pulmonary:      Effort: Pulmonary effort is normal.   Abdominal:      Tenderness: There is no guarding.   Musculoskeletal:         General: No deformity.      Cervical back: Neck supple.   Skin:     General: Skin is warm and dry.   Neurological:      Mental Status: She is alert and oriented to person, place, and time.   Psychiatric:         Mood and Affect: Mood normal.        Neck Ultrasound Report    Indication: Thyroid nodule    Comparison Imaging: yes     Real time high resolution imaging of the thyroid gland was performed in transverse and longitudinal planes.        The right thyroid lobe measured 3.45 cm length x 1.66 cm AP x 1.38 cm in TV dimension. The isthmus measured 7.7 mm in thickness. The left thyroid lobe measured 5.14 cm length x 2.83 cm AP x 2.45 cm in TV dimension.      Nodule #1:   Right mid lobe; L 1.40cm x TV 1.19cm x AP 0.89cm   Spongiform  No calcifications  Well-defined margins/Halo  Grade 1 vascularity      Nodule #2  1.  Left mid thyroid lobe; L 3.12 cm x TV 2.58cm x AP 2.65cm   2.  Difficult to tell if it is multiple nodules with poorly defined borders that have coalesced together or 1 large nodule that is very heterogenous, there are macrocalcifications present, portions of hyperechoic and hypoechoic areas and poorly defined borders without halo  There are portions with significant vascular flow in their portions with no vascular flow     No pathologic lymph nodes were seen.     Impression: multiple thyroid nodules, left sided nodule suspicious and given size recommend repeat FNA    Images saved to PACS.     Ultrasound Guided Thyroid Biopsy    Indication:  Thyroid nodule    After informed consent, the neck was prepped in sterile  fashion.     With ultrasound guidance of needle localization, 3 aspirates were obtained with sterile 27g and 1 with 25g needles. Sample was placed in CytoLyte, Afirma was not done.     The patient tolerated the procedure without difficulty or complications.      Assessment and plan:     Diagnoses and all orders for this visit:    1. Multiple thyroid nodules (Primary)  Assessment & Plan:  -Left-sided nodule remains suspicious to me although it has not changed significantly in characteristics and has a previous benign FNA  -Repeat FNA done today  -If FLUS would recommend surgical consultation over Afirma test  -Further workup pending results    Orders:  -     US Thyroid; Future  -     NON-GYN CYTOLOGY, [facility] ([facility])    2. Hypercalcemia  Assessment & Plan:  -Check 24-hour urine studies  -Repeat labs when she turns the urine into the lab  -Has known osteoporosis so would consider surgery if this is primary hyperparathyroidism     In the interim, counseled pt  that these general measures can be taken:  Patients with hyperparathyroidism who do not have symptoms are advised to:  ?Avoid thiazide diuretics since these drugs may further increase blood calcium levels.  ?Avoid dehydration, prolonged bed rest or inactivity, and a high calcium diet since these can increase blood calcium levels.  ?Minimize bone loss by remaining active.  ?Drink an adequate amount of fluid throughout the day. This may help to minimize the risk of kidney stones.  ?Maintain a moderate calcium intake. Lower calcium intake will stimulate more parathyroid hormone secretion while higher calcium intake may worsen hypercalcemia.   ?Consume a moderate amount of vitamin D. Vitamin D deficiency can stimulate PTH secretion and bone resorption and should be avoided. Over-replacement of Vit D will worsen the hypercalcemia     Orders:  -     Comprehensive Metabolic Panel; Future  -     Calcium, Urine, 24 Hour - Urine, Clean Catch; Future  -      Vitamin D 25 Hydroxy; Future  -     PTH, Intact; Future         Return in about 6 months (around 11/22/2025) for Thyroid nodule and hypercalcemia .     Electronically signed by Kyle S Rosenstein, MD

## 2025-05-22 ENCOUNTER — OFFICE VISIT (OUTPATIENT)
Dept: ENDOCRINOLOGY | Facility: CLINIC | Age: 70
End: 2025-05-22
Payer: MEDICARE

## 2025-05-22 ENCOUNTER — TELEPHONE (OUTPATIENT)
Dept: ENDOCRINOLOGY | Facility: CLINIC | Age: 70
End: 2025-05-22

## 2025-05-22 VITALS
HEART RATE: 64 BPM | BODY MASS INDEX: 26.55 KG/M2 | OXYGEN SATURATION: 94 % | WEIGHT: 135.2 LBS | DIASTOLIC BLOOD PRESSURE: 70 MMHG | SYSTOLIC BLOOD PRESSURE: 118 MMHG | HEIGHT: 60 IN

## 2025-05-22 DIAGNOSIS — E83.52 HYPERCALCEMIA: ICD-10-CM

## 2025-05-22 DIAGNOSIS — E04.2 MULTIPLE THYROID NODULES: Primary | ICD-10-CM

## 2025-05-22 NOTE — ASSESSMENT & PLAN NOTE
-Left-sided nodule remains suspicious to me although it has not changed significantly in characteristics and has a previous benign FNA  -Repeat FNA done today  -If FLUS would recommend surgical consultation over Afirma test  -Further workup pending results

## 2025-05-22 NOTE — TELEPHONE ENCOUNTER
----- Message from Kyle Rosenstein sent at 5/22/2025  1:59 PM EDT -----  Regarding: DXA  Could you call her PCP to get her most recent DXA scan results faxed to us

## 2025-05-27 ENCOUNTER — RESULTS FOLLOW-UP (OUTPATIENT)
Dept: ENDOCRINOLOGY | Facility: CLINIC | Age: 70
End: 2025-05-27
Payer: MEDICARE

## 2025-05-27 LAB — REF LAB TEST METHOD: NORMAL

## 2025-05-27 NOTE — ASSESSMENT & PLAN NOTE
"Pt calls to send FYI to PCP that he's in the hospital currently and will need to go to a TCU for strengthening, due to being so weak.    Also need FMLA in the future - just doesn't want PCP \"caught off guard\" with what is going on with him.    Writer advised pt, providers are notified if pt is admitted to hospital w/in [facility] & hospital follow-up can be scheduled once out of TCU - will go over his condition and needed paperwork.    Pt presented understanding, but wanted message to be sent to PCP to be aware - he's okay with PCP calling him if there are questions/concerns.    Routing to PCP per request.   " -Check 24-hour urine studies  -Repeat labs when she turns the urine into the lab  -Has known osteoporosis so would consider surgery if this is primary hyperparathyroidism     In the interim, counseled pt  that these general measures can be taken:  Patients with hyperparathyroidism who do not have symptoms are advised to:  ?Avoid thiazide diuretics since these drugs may further increase blood calcium levels.  ?Avoid dehydration, prolonged bed rest or inactivity, and a high calcium diet since these can increase blood calcium levels.  ?Minimize bone loss by remaining active.  ?Drink an adequate amount of fluid throughout the day. This may help to minimize the risk of kidney stones.  ?Maintain a moderate calcium intake. Lower calcium intake will stimulate more parathyroid hormone secretion while higher calcium intake may worsen hypercalcemia.   ?Consume a moderate amount of vitamin D. Vitamin D deficiency can stimulate PTH secretion and bone resorption and should be avoided. Over-replacement of Vit D will worsen the hypercalcemia

## 2025-05-29 ENCOUNTER — ANTICOAGULATION VISIT (OUTPATIENT)
Dept: PHARMACY | Facility: HOSPITAL | Age: 70
End: 2025-05-29
Payer: MEDICARE

## 2025-05-29 DIAGNOSIS — Z79.01 CHRONIC ANTICOAGULATION: Primary | ICD-10-CM

## 2025-05-29 DIAGNOSIS — I05.9 MITRAL VALVE DISEASE: ICD-10-CM

## 2025-05-29 LAB — INR PPP: 2.2

## 2025-05-29 NOTE — PROGRESS NOTES
"[facility] Anticoagulation Clinic Progress Note     Patient Demographics    Method of INR reporting: JOY HOME MONITOR  Estimated OOP Cost:    Indication:  Mechanical MVR  Referring Provider Ernst Mike MD  Reason patient is not on a DOAC: Unknown   Goal INR:  2-2.5  Warfarin Start Date ~ 2013  Reason patient is not on home monitor: N/A   [id number]: N/A  Planned Duration of Therapy Indefinite   Relevant medical history:    Bleed Risk/History: No history of bleed  Tablets Strength: 5 mg (peach) and 3 mg (brown)        Anticoagulation Clinic INR History    Date 2/11 2/19 2/25 3/4 3/19 3/25 4/1 4/8 4/15 4/22 4/29 5/7 5/13 5/20 5/29    Total Weekly Dose 22.5 mg 27.5 mg 25 mg 25 mg 25 mg  25 mg 25 mg 25 mg 25 mg 25 mg  25 mg  25 mg  25 mg  22.5 mg 22.5 mg    INR 2.0 3.5 2.0 2.8 2.3 2.2 2.7 2.5 2.8 2.4 2.9 2.6 3.1 2.2 2.2    Notes Rec'd 2/12  redx1    Rec'd 4/2 Rec'd 4/9  Rec'd 4/23  Spoke 5/8  Rec'd 5/21       Patient Contact Information  Verbal release: Not obtained, mailing out along with CCA and R&R 5.22.25    Preferred contact number: [phone]  Alternative contact number(s): [phone]  Patient Appropriate for \"WarfNoCall\"? No   Preferred contact name: Rivka Marquez  Alternative contact name(s): [facility] (relative)      Preferred contact relation: Self  Lab contact information (if applicable):           Subjective Findings    Drug Interactions  Dietary Findings    Historical: Aspirin, duloxetine, trazodone, raloxifene, bumetanide, turmeric  Historical GLV intake (date updated): no more than 2 servings of GLV weekly    New (including OTC): None  GLV changes this encounter: None     Alcohol and Tobacco   Historical: Vapes 5 cartridges weekly    New: None       Patient Findings Narrative:    Negatives: Signs/symptoms of thrombosis, Signs/symptoms of bleeding, Laboratory test error suspected, Change in health, Change in alcohol use, Change in activity, Upcoming invasive procedure, " Emergency department visit, Upcoming dental procedure, Missed doses, Extra doses, Change in medications, Change in diet/appetite, Hospital admission, Bruising, Other complaints   Comments: All findings negative per patient.       Assessment and Plan:    INR is therapeutic at 2.2. Instructed patient to continue warfarin 2.5 mg daily except 5 mg Mon/Thurs until recheck.   Recheck INR 6.03.25  Verbal and written information provided. Rivka Marquez expresses understanding by teach back and has no further questions at this time.      Jessica Flanagan CPhT, [facility]   13:37 EDT   5/29/2025    IShilpi, PharmD, have reviewed the note in full and agree with the assessment and plan.  05/29/25  13:44 EDT

## 2025-06-03 ENCOUNTER — ANTICOAGULATION VISIT (OUTPATIENT)
Dept: PHARMACY | Facility: HOSPITAL | Age: 70
End: 2025-06-03
Payer: MEDICARE

## 2025-06-03 DIAGNOSIS — I05.9 MITRAL VALVE DISEASE: ICD-10-CM

## 2025-06-03 DIAGNOSIS — Z79.01 CHRONIC ANTICOAGULATION: Primary | ICD-10-CM

## 2025-06-03 LAB — INR PPP: 1.5

## 2025-06-03 NOTE — PROGRESS NOTES
"[facility] Anticoagulation Clinic Progress Note     Patient Demographics    Method of INR reporting: JOY HOME MONITOR  Estimated OOP Cost:    Indication:  Mechanical MVR  Referring Provider Ernst Mike MD  Reason patient is not on a DOAC: Unknown   Goal INR:  2-2.5  Warfarin Start Date ~ 2013  Reason patient is not on home monitor: N/A   [id number]: N/A  Planned Duration of Therapy Indefinite   Relevant medical history:    Bleed Risk/History: No history of bleed  Tablets Strength: 5 mg (peach) and 3 mg (brown)        Anticoagulation Clinic INR History    Date 2/11 2/19 2/25 3/4 3/19 3/25 4/1 4/8 4/15 4/22 4/29 5/7 5/13 5/20 5/29 6/3   Total Weekly Dose 22.5 mg 27.5 mg 25 mg 25 mg 25 mg  25 mg 25 mg 25 mg 25 mg 25 mg  25 mg  25 mg  25 mg  22.5 mg 22.5 mg 22.5 mg   INR 2.0 3.5 2.0 2.8 2.3 2.2 2.7 2.5 2.8 2.4 2.9 2.6 3.1 2.2 2.2 1.5   Notes Rec'd 2/12  redx1    Rec'd 4/2 Rec'd 4/9  Rec'd 4/23  Spoke 5/8  Rec'd 5/21       Date                 Total Weekly  Dose                INR                Notes                  Patient Contact Information  Verbal release: Not obtained, mailing out along with CCA and R&R 5.22.25    Preferred contact number: [phone]  Alternative contact number(s): [phone]  Patient Appropriate for \"WarfNoCall\"? No   Preferred contact name: Rivka Marquez  Alternative contact name(s): [facility] (relative)      Preferred contact relation: Self  Lab contact information (if applicable):           Subjective Findings    Drug Interactions  Dietary Findings    Historical: Aspirin, duloxetine, trazodone, raloxifene, bumetanide, turmeric  Historical GLV intake (date updated): no more than 2 servings of GLV weekly    New (including OTC): None  GLV changes this encounter: None     Alcohol and Tobacco   Historical: Vapes 5 cartridges weekly    New: None       Patient Findings Narrative:  Positives: Change in diet/appetite   Negatives: Signs/symptoms of thrombosis, Signs/symptoms " of bleeding, Laboratory test error suspected, Change in health, Change in alcohol use, Change in activity, Upcoming invasive procedure, Emergency department visit, Upcoming dental procedure, Missed doses, Extra doses, Change in medications, Hospital admission, Bruising, Other complaints   Comments: Patient had large serving of coleslaw this past weekend in addition to normal GLV.    All other findings negative per patient         Assessment and Plan:    INR is subtherapeutic at 1.5 (goal 2.0-2.5). Instructed patient to BOOST today's dose to warfarin 5 mg and continue warfarin 2.5 mg daily except 5 mg Mon/Thurs until recheck.   Recheck INR 6/10/25  Verbal and written information provided. Rivka Marquez expresses understanding by teach back and has no further questions at this time.      Shilpi Pablo, PharmD  6/3/2025  13:19 EDT

## 2025-06-05 NOTE — ASSESSMENT & PLAN NOTE
Coronary Artery Disease (OPTIONAL): Coronary artery disease is stable.  Continue current treatment regimen. Dietary sodium restriction.  Cardiac status will be reassessed in 3 months.  Continue aspirin 81 mg once a day

## 2025-06-05 NOTE — ASSESSMENT & PLAN NOTE
Hypertension is stable and controlled  Continue current treatment regimen.  Weight loss.  Ambulatory blood pressure monitoring.  Blood pressure will be reassessed in 3 months.  Continue nifedipine XL 90 mg once a day, metoprolol succinate 100 mg once a day.

## 2025-06-05 NOTE — PROGRESS NOTES
Cardiology Follow-Up Note     Name: Rivka Marquez  :   1955  PCP: Margo Willingham MD  Date:   2025  Department: [place] [facility] CARDIOLOGY  [address]  [facility] [address]  Fax [phone]  Phone [phone]    Chief Complaint   Patient presents with    Hypertension    Hyperlipidemia    Coronary Artery Disease   Problem list:    Coronary artery disease  3/3/2013 non-STEMI left heart cath EF 50% PTCA and stenting of the circumflex coronary artery.  2013 left and right heart cath with pulmonary angiogram patent stent to the circumflex coronary artery RCA occluded with collaterals.  7/15/2014 angina pectoris abnormal stress test EF 55% CAD with patent stent to the circumflex nondominant RCA totally occluded with collaterals.  Mitral regurgitationstatus post mechanical valve  10/8/2013 Ronen 27/29 mechanical prosthesis.    2024 echocardiogram EF 54% mild tricuspid regurgitation.  Mechanical valve okay.  Adult Transthoracic Echo Complete W/ Cont if Necessary Per Protocol (2025 13:56)  Pulmonary embolism  3/9/2013 left heart cath with pulmonary angiogram  Dual-chamber insertion 10/15/2013 by Dr. Mittal [employer] Blue model number DR [id number] serial #[id number]  Hypertension benign essential   Hypercholesterolemia  Arthritis/gout  Thyroid disease          Subjective     History of Present Illness  Rivka Marquez is a 69 y.o. female who presents today for routine 3-month follow-up and echocardiogram.  Patient has significant cardiac past medical history including coronary artery disease native vessel, mechanical mitral valve replacement chronically anticoagulated with warfarin, hypertension, hyperlipidemia, pulmonary embolism, sick sinus syndrome status post dual-chamber pacemaker.  The patient echocardiogram is unremarkable, the left atrial volume was normal.  Mechanical valve was fine.            Current Outpatient  "Medications:     ALPRAZolam (XANAX) 0.5 MG tablet, Take  by mouth., Disp: , Rfl:     aspirin (aspirin) 81 MG EC tablet, Take  by mouth Daily., Disp: , Rfl:     atorvastatin (LIPITOR) 40 MG tablet, Take 0.5 tablets by mouth Daily., Disp: 90 tablet, Rfl: 0    bumetanide (BUMEX) 2 MG tablet, Take 1 tablet by mouth Daily As Needed (if SOA or stomach distended or ankles swollen)., Disp: , Rfl:     DULoxetine (CYMBALTA) 60 MG capsule, Take 1 capsule by mouth Daily., Disp: , Rfl:     gabapentin (NEURONTIN) 300 MG capsule, Take 1-2 capsules by mouth. 2 tabs PM, 1 tab AM, Disp: , Rfl:     lisinopril (PRINIVIL,ZESTRIL) 40 MG tablet, Take 0.5 tablets by mouth Daily., Disp: , Rfl:     Magnesium Gluconate 550 MG tablet, Take 30 mg by mouth., Disp: , Rfl:     metoprolol succinate XL (TOPROL-XL) 100 MG 24 hr tablet, TAKE 1 TABLET ONE TIME DAILY, Disp: 90 tablet, Rfl: 1    multivitamin with minerals (MULTIVITAMIN ADULT PO), Take 1 tablet by mouth Daily., Disp: , Rfl:     NIFEdipine XL (PROCARDIA XL) 90 MG 24 hr tablet, TAKE 1 TABLET ONE TIME DAILY, Disp: 90 tablet, Rfl: 3    nitroglycerin (NITROSTAT) 0.4 MG SL tablet, Place 1 tablet under the tongue Every 5 (Five) Minutes As Needed for Chest Pain. Take no more than 3 doses in 15 minutes., Disp: , Rfl:     raloxifene (EVISTA) 60 MG tablet, Take 1 tablet by mouth Daily., Disp: , Rfl:     traZODone (DESYREL) 100 MG tablet, Take 1 tablet by mouth Every Night., Disp: , Rfl:     Turmeric 500 MG capsule, Take 1 capsule by mouth Daily., Disp: , Rfl:     vitamin D3 125 MCG (5000 UT) capsule capsule, Take 1 capsule by mouth Daily., Disp: , Rfl:     warfarin (COUMADIN) 5 MG tablet, Take 1 tablet by mouth Daily. Take 1 (one) to 1 and 1/2 (one and one half) tablets by mouth once daily or as directed by the anticoagulation clinic, Disp: 45 tablet, Rfl: 2    Objective     Vital Signs:  /69 (BP Location: Right arm, Patient Position: Sitting)   Pulse 84   Ht 152.4 cm (60\")   Wt 60.4 kg " "(133 lb 1.6 oz)   BMI 25.99 kg/m²   Estimated body mass index is 25.99 kg/m² as calculated from the following:    Height as of this encounter: 152.4 cm (60\").    Weight as of this encounter: 60.4 kg (133 lb 1.6 oz).             Vitals reviewed.   Constitutional:       Appearance: Normal and healthy appearance.   Eyes:      Pupils: Pupils are equal, round, and reactive to light.   Pulmonary:      Effort: Pulmonary effort is normal.   Chest:      Chest wall: Not tender to palpatation.   Cardiovascular:      PMI at left midclavicular line. Normal rate. Regular rhythm.      No gallop.       Comments: Positive mechanical click  Pulses:     Intact distal pulses.   Edema:     Peripheral edema absent.   Skin:     General: Skin is warm.   Psychiatric:         Behavior: Behavior is cooperative.              Data Review:   Lab Results   Component Value Date    GLUCOSE 99 11/22/2024    BUN 18 11/22/2024    CREATININE 0.96 11/22/2024    EGFRIFNONA >60 12/31/2021    EGFRIFAFRI >60 12/31/2021    BCR 18.8 11/22/2024    K 3.9 11/22/2024    CO2 28.1 11/22/2024    CALCIUM 10.6 (H) 11/22/2024    ALBUMIN 3.8 11/22/2024    AST 17 11/22/2024    ALT 16 11/22/2024     Lab Results   Component Value Date    CHLPL 123 01/01/2022    TRIG 152 (H) 01/01/2022    HDL 44 (L) 01/01/2022    LDL 48.6 01/01/2022      Lab Results   Component Value Date    WBC 7.41 12/31/2021    RBC 4.36 12/31/2021    HGB 13.3 12/31/2021    HCT 40.9 12/31/2021    MCV 94 12/31/2021     12/31/2021     Lab Results   Component Value Date    TSH 1.230 11/22/2024     Lab Results   Component Value Date    HGBA1C 5.6 12/31/2021     Lab Results   Component Value Date    INR 1.50 06/03/2025    INR 2.20 05/29/2025    INR 2.20 05/20/2025             Assessment and Plan     Assessment & Plan  Coronary artery disease involving native coronary artery of native heart without angina pectoris  Coronary Artery Disease (OPTIONAL): Coronary artery disease is stable.  Continue current " treatment regimen. Dietary sodium restriction.  Cardiac status will be reassessed in 3 months.  Continue aspirin 81 mg once a day       Mitral valve disease (S/P MVR)  Continue Coumadin to keep the INR between 2.5-3.5.       Primary hypertension  Hypertension is stable and controlled  Continue current treatment regimen.  Weight loss.  Ambulatory blood pressure monitoring.  Blood pressure will be reassessed in 3 months.  Continue nifedipine XL 90 mg once a day, metoprolol succinate 100 mg once a day.       Hyperlipidemia LDL goal <70   Lipid abnormalities are stable    Plan:  Continue same medication/s without change.      Discussed medication dosage, use, side effects, and goals of treatment in detail.    Counseled patient on lifestyle modifications to help control hyperlipidemia.   Advised patient to exercise for 150 minutes weekly. (30 minute brisk walk, 5 days a week for example)    Patient Treatment Goals:   LDL goal is less than 70    Followup in 3 months.  Continue Lipitor 40 mg once a day         Advised to continue current cardiac medications. Please notify of any issues. Discussed with the patient compliance with medical management and follow-up.     Follow Up  Return in about 3 months (around 9/6/2025).    Call if you have any significant symptoms or go to the [facility] Emergency room if possible.     Ernst Mike MD, FACC,[facility]  [facility]    Part of this note may be an electronic transcription/translation of spoken language to printed text using the Dragon Dictation System.

## 2025-06-06 ENCOUNTER — OFFICE VISIT (OUTPATIENT)
Age: 70
End: 2025-06-06
Payer: MEDICARE

## 2025-06-06 VITALS
WEIGHT: 133.1 LBS | DIASTOLIC BLOOD PRESSURE: 69 MMHG | SYSTOLIC BLOOD PRESSURE: 117 MMHG | HEART RATE: 84 BPM | BODY MASS INDEX: 26.13 KG/M2 | HEIGHT: 60 IN

## 2025-06-06 DIAGNOSIS — E78.5 HYPERLIPIDEMIA LDL GOAL <70: ICD-10-CM

## 2025-06-06 DIAGNOSIS — I25.10 CORONARY ARTERY DISEASE INVOLVING NATIVE CORONARY ARTERY OF NATIVE HEART WITHOUT ANGINA PECTORIS: Primary | ICD-10-CM

## 2025-06-06 DIAGNOSIS — I05.9 MITRAL VALVE DISEASE: ICD-10-CM

## 2025-06-06 DIAGNOSIS — I10 PRIMARY HYPERTENSION: ICD-10-CM

## 2025-06-06 RX ORDER — TRAZODONE HYDROCHLORIDE 100 MG/1
100 TABLET ORAL NIGHTLY
COMMUNITY
Start: 2025-06-03

## 2025-06-10 ENCOUNTER — ANTICOAGULATION VISIT (OUTPATIENT)
Dept: PHARMACY | Facility: HOSPITAL | Age: 70
End: 2025-06-10
Payer: MEDICARE

## 2025-06-10 DIAGNOSIS — Z79.01 CHRONIC ANTICOAGULATION: Primary | ICD-10-CM

## 2025-06-10 DIAGNOSIS — I05.9 MITRAL VALVE DISEASE: ICD-10-CM

## 2025-06-10 LAB — INR PPP: 2.7

## 2025-06-10 NOTE — PROGRESS NOTES
"[facility] Anticoagulation Clinic Progress Note     Patient Demographics    Method of INR reporting: JOY HOME MONITOR  Estimated OOP Cost:    Indication:  Mechanical MVR  Referring Provider Ernst Mike MD  Reason patient is not on a DOAC: Unknown   Goal INR:  2-2.5  Warfarin Start Date ~ 2013  Reason patient is not on home monitor: N/A   [id number]: N/A  Planned Duration of Therapy Indefinite   Relevant medical history:    Bleed Risk/History: No history of bleed  Tablets Strength: 5 mg (peach) and 3 mg (brown)        Anticoagulation Clinic INR History    Date 2/11 2/19 2/25 3/4 3/19 3/25 4/1 4/8 4/15 4/22 4/29 5/7 5/13 5/20 5/29 6/3   Total Weekly Dose 22.5 mg 27.5 mg 25 mg 25 mg 25 mg  25 mg 25 mg 25 mg 25 mg 25 mg  25 mg  25 mg  25 mg  22.5 mg 22.5 mg 22.5 mg   INR 2.0 3.5 2.0 2.8 2.3 2.2 2.7 2.5 2.8 2.4 2.9 2.6 3.1 2.2 2.2 1.5   Notes Rec'd 2/12  redx1    Rec'd 4/2 Rec'd 4/9  Rec'd 4/23  Spoke 5/8  Rec'd 5/21       Date  6/10               Total Weekly  Dose 25 mg               INR 2.7               Notes                  Patient Contact Information  Verbal release: Not obtained, mailing out along with CCA and R&R 5.22.25    Preferred contact number: [phone]  Alternative contact number(s): [phone]  Patient Appropriate for \"WarfNoCall\"? No   Preferred contact name: Rivka Marquez  Alternative contact name(s): [facility] (relative)      Preferred contact relation: Self  Lab contact information (if applicable):           Subjective Findings    Drug Interactions  Dietary Findings    Historical: Aspirin, duloxetine, trazodone, raloxifene, bumetanide, turmeric  Historical GLV intake (date updated): no more than 2 servings of GLV weekly    New (including OTC): None  GLV changes this encounter: Patient reports no GLV this past week     Alcohol and Tobacco   Historical: Vapes 5 cartridges weekly    New: None       Patient Findings    Positives: Change in medications, Change in " diet/appetite, Bruising   Negatives: Signs/symptoms of thrombosis, Signs/symptoms of bleeding, Laboratory test error suspected, Change in health, Change in alcohol use, Change in activity, Upcoming invasive procedure, Emergency department visit, Upcoming dental procedure, Missed doses, Extra doses, Hospital admission, Other complaints   Comments: Patient mentions bruising from bumping into things as normal. Patient did not have any GLV this past week. Patient is hoping to receive a steroid injection to her hip Thursday. All other findings negative per patient.         Assessment and Plan:    INR slightly supra therapeutic at 2.7 (goal 2.0-2.5). Instructed patient to continue warfarin 2.5 mg daily except 5 mg Mon/Thurs until recheck. Patient instructed to have a serving of GLV.   Recheck INR 6/17/25  Verbal and written information provided. Rivka Marquez expresses understanding by teach back and has no further questions at this time.    Kristopher Lacey   [facility]  6/10/2025 13:21 EDT    I, Shilpi Pablo, PharmD, have reviewed the note in full and agree with the assessment and plan.  06/10/25  13:39 EDT

## 2025-06-16 ENCOUNTER — OFFICE VISIT (OUTPATIENT)
Dept: ORTHOPEDIC SURGERY | Facility: CLINIC | Age: 70
End: 2025-06-16
Payer: MEDICARE

## 2025-06-16 VITALS
WEIGHT: 133 LBS | HEIGHT: 60 IN | DIASTOLIC BLOOD PRESSURE: 70 MMHG | SYSTOLIC BLOOD PRESSURE: 110 MMHG | BODY MASS INDEX: 26.11 KG/M2

## 2025-06-16 DIAGNOSIS — T84.038A LOOSE TOTAL HIP ARTHROPLASTY, INITIAL ENCOUNTER: Primary | ICD-10-CM

## 2025-06-16 DIAGNOSIS — Z96.649 LOOSE TOTAL HIP ARTHROPLASTY, INITIAL ENCOUNTER: Primary | ICD-10-CM

## 2025-06-16 NOTE — PROGRESS NOTES
[facility] Orthopaedic Surgery Clinic Note    Subjective     Chief Complaint   Patient presents with    Right Hip - Pain        HPI    Rivka Marquez is a 69 y.o. female who presents with new problem of: right hip pain.  Onset: atraumatic and gradual in nature. The issue has been ongoing for 5 year(s). Pain is a 9/10 on the pain scale. Pain is described as dull, aching, and throbbing. Associated symptoms include pain. The pain is worse with walking, climbing stairs, and rising from seated position; sitting improve the pain. Previous treatments have included: cane/walker and physical therapy.  Total hip arthroplasty with Dr. Mckinley several years ago.  She states she had no relief of her hip pain following the surgery.  Her niece is with her today.  Patient was recently seen at the [facility], and revision surgery was discussed with a workup for infection prior to final decision making.  Pain is located in the groin and anterior aspect of the hip.    I have reviewed the following portions of the patient's history and agree with: History of Present Illness and Review of Systems    Patient Active Problem List   Diagnosis    Tobacco abuse (Last cig ~12/2020)    Bilateral solid and semisolid nodules    Chronic anticoagulation (Warfarin)    Mitral valve disease (S/P MVR)    Multiple thyroid nodules    Hypercalcemia    Aneurysm of ascending aorta without rupture    Presence of cardiac pacemaker    Sick sinus syndrome    Hyperlipidemia LDL goal <55    Coronary artery disease involving native coronary artery of native heart    Primary hypertension     Past Medical History:   Diagnosis Date    Arthritis     Coronary artery disease 5/2013    Deep vein thrombosis 2013    Depression     GERD (gastroesophageal reflux disease)     Gout     Heart attack     Heart valve disease     High cholesterol     Hypertension     Mitral regurgitation     Pulmonary embolism 3-2013    Pulmonic valve insufficiency     Thyroid disease      Tricuspid regurgitation       Past Surgical History:   Procedure Laterality Date    APPENDECTOMY      CARDIAC CATHETERIZATION  07/15/2014    PI: CAD, angina, Abnl stress test. EF 55%. CAD with patent LCx  stent. Nondominant RCA has mid total occlusion with collaterals.    CARDIAC CATHETERIZATION  06/27/2013    LHC/RHC/Selective bilat.pulm angiogram. EF>55%. Dyspnea is  multifactorial and due to increased filling pressures, increased  afterload, at least moderate mitral regurgitation (2-3+), and  pulmonary hypertension (at least 4 Woods units). Patent LCx  stent, RCA chronically occluded with left-to-left collaterals.  Ascending aorta enlargement of 3.8 cm in greater curvature. No  residual or acute PE i    CARDIAC CATHETERIZATION  03/09/2013    LHC/Pulmonary angiogram.    CARDIAC CATHETERIZATION  03/03/2013    PI: NSTEMI. EF 50%. PTCA and Xience stent x1 to LCirc.    CARDIAC VALVE REPLACEMENT  10/2013    COLONOSCOPY  08/31/2016    HIP ARTHROPLASTY      INSERT / REPLACE / REMOVE PACEMAKER  10/2013    Dr.John Mittal [facility] -Model NO-Accent  [id number]  Serial No-[id number]    MITRAL VALVE REPAIR/REPLACEMENT      MITRAL VALVE REPLACEMENT  10/08/2013    On-X 27/29 mechanical prosthesis w/ intraop RUDDY.      Family History   Problem Relation Age of Onset    Breast cancer Mother 73    Hypertension Mother     Hyperlipidemia Mother     Heart attack Mother     Hypertension Sister     Diabetes Brother     Heart attack Brother     Ovarian cancer Neg Hx      Social History     Socioeconomic History    Marital status: Single   Tobacco Use    Smoking status: Some Days     Types: Electronic Cigarette    Smokeless tobacco: Never    Tobacco comments:     currently vapes as of 7/1/21   Vaping Use    Vaping status: Every Day    Substances: Nicotine    Devices: Refillable tank    Passive vaping exposure: Yes   Substance and Sexual Activity    Alcohol use: Not Currently    Drug use: Never    Sexual activity: Not Currently       Current Outpatient Medications on File Prior to Visit   Medication Sig Dispense Refill    ALPRAZolam (XANAX) 0.5 MG tablet Take  by mouth.      aspirin (aspirin) 81 MG EC tablet Take  by mouth Daily.      atorvastatin (LIPITOR) 40 MG tablet Take 0.5 tablets by mouth Daily. 90 tablet 0    bumetanide (BUMEX) 2 MG tablet Take 1 tablet by mouth Daily As Needed (if SOA or stomach distended or ankles swollen).      DULoxetine (CYMBALTA) 60 MG capsule Take 1 capsule by mouth Daily.      gabapentin (NEURONTIN) 300 MG capsule Take 1-2 capsules by mouth. 2 tabs PM, 1 tab AM      lisinopril (PRINIVIL,ZESTRIL) 40 MG tablet Take 0.5 tablets by mouth Daily.      Magnesium Gluconate 550 MG tablet Take 30 mg by mouth.      metoprolol succinate XL (TOPROL-XL) 100 MG 24 hr tablet TAKE 1 TABLET ONE TIME DAILY 90 tablet 1    multivitamin with minerals (MULTIVITAMIN ADULT PO) Take 1 tablet by mouth Daily.      NIFEdipine XL (PROCARDIA XL) 90 MG 24 hr tablet TAKE 1 TABLET ONE TIME DAILY 90 tablet 3    nitroglycerin (NITROSTAT) 0.4 MG SL tablet Place 1 tablet under the tongue Every 5 (Five) Minutes As Needed for Chest Pain. Take no more than 3 doses in 15 minutes.      raloxifene (EVISTA) 60 MG tablet Take 1 tablet by mouth Daily.      traZODone (DESYREL) 100 MG tablet Take 1 tablet by mouth Every Night.      Turmeric 500 MG capsule Take 1 capsule by mouth Daily.      vitamin D3 125 MCG (5000 UT) capsule capsule Take 1 capsule by mouth Daily.      warfarin (COUMADIN) 5 MG tablet Take 1 tablet by mouth Daily. Take 1 (one) to 1 and 1/2 (one and one half) tablets by mouth once daily or as directed by the anticoagulation clinic 45 tablet 2     No current facility-administered medications on file prior to visit.      Allergies   Allergen Reactions    Vitamin K Seizure        Review of Systems   Constitutional:  Negative for activity change, appetite change, chills, diaphoresis, fatigue, fever and unexpected weight change.   HENT:  Negative  "for congestion, dental problem, drooling, ear discharge, ear pain, facial swelling, hearing loss, mouth sores, nosebleeds, postnasal drip, rhinorrhea, sinus pressure, sneezing, sore throat, tinnitus, trouble swallowing and voice change.    Eyes:  Negative for photophobia, pain, discharge, redness, itching and visual disturbance.   Respiratory:  Negative for apnea, cough, choking, chest tightness, shortness of breath, wheezing and stridor.    Cardiovascular:  Negative for chest pain, palpitations and leg swelling.   Gastrointestinal:  Negative for abdominal distention, abdominal pain, anal bleeding, blood in stool, constipation, diarrhea, nausea, rectal pain and vomiting.   Endocrine: Negative for cold intolerance, heat intolerance, polydipsia, polyphagia and polyuria.   Genitourinary:  Negative for decreased urine volume, difficulty urinating, dysuria, enuresis, flank pain, frequency, genital sores, hematuria and urgency.   Musculoskeletal:  Positive for arthralgias. Negative for back pain, gait problem, joint swelling, myalgias, neck pain and neck stiffness.   Skin:  Negative for color change, pallor, rash and wound.   Allergic/Immunologic: Negative for environmental allergies, food allergies and immunocompromised state.   Neurological:  Negative for dizziness, tremors, seizures, syncope, facial asymmetry, speech difficulty, weakness, light-headedness, numbness and headaches.   Hematological:  Negative for adenopathy. Does not bruise/bleed easily.   Psychiatric/Behavioral:  Negative for agitation, behavioral problems, confusion, decreased concentration, dysphoric mood, hallucinations, self-injury, sleep disturbance and suicidal ideas. The patient is not nervous/anxious and is not hyperactive.         Objective      Physical Exam  /70   Ht 152.4 cm (60\")   Wt 60.3 kg (133 lb)   LMP  (LMP Unknown)   BMI 25.97 kg/m²     Body mass index is 25.97 kg/m².  BMI is >= 25 and <30. (Overweight) The following options " were offered after discussion;: referral to primary care        General:   Mental Status:  Alert   Appearance: Cooperative, in no acute distress   Build and Nutrition: Well-nourished well-developed female   Orientation: Alert and oriented to person, place and time   Posture: Normal   Gait: Limp on the right    Integument:   Right hip: Wound is well-healed with no signs of infection    Lower Extremity:   Right Hip:    Tenderness:  None    Swelling:  None    Crepitus:  None    Range of motion:  External Rotation: 30°, with pain       Internal Rotation: 30°, with pain       Flexion:  100°       Extension:  0°    Deformities:  None  Functional testing: Positive Stinchfield    Short on the right compared to the left      Imaging/Studies      Imaging Results (Last 24 Hours)       ** No results found for the last 24 hours. **          Narrative    CLINICAL INDICATION:  hip pain    TECHNIQUE:  XR HIP RIGHT 2 OR 3 VIEWS    COMPARISON:  Abdominal CT dated October 1, 2013..    FINDINGS:  2 views of the right hip show noncemented total hip arthroplasty with acetabular protrusio and possible superior migration of the femoral prosthesis. The right lesser trochanter projects superior to the left. Left hip joint space and alignment are normal. Pubic symphysis and sacroiliac joints are normal. Severe degenerative disc changes at L4-L5.   Impression    Right noncemented total hip arthroplasty with acetabular protrusio and possible superior subsidence of the acetabular prosthesis    CRITICAL RESULT:    No.    COMMUNICATION:  Per this written report.    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    Assessment and Plan     Diagnoses and all orders for this visit:    1. Loose total hip arthroplasty, initial encounter (Primary)        1. Loose total hip arthroplasty, initial encounter          I reviewed my findings with the patient and her niece.  I agree that it appears that she has a loose acetabular cup.  Previous surgery several years ago with Dr. Mckinley.  It may be beneficial to obtain the x-rays surrounding her hip replacement and any follow-up images, as she notes no improvement in her hip after surgical intervention.  However, I do have some concerns about her recall and memory, as I asked her if she had her blood work done it was recommended at the [facility] and she said no.  However, I was able to access the lab results, which showed a sed rate of 8, a CRP of less than 3.0 and a white blood cell count of 9.29.  This findings would not be indicative of a likely infection.  At this point, I did recommend that she consider the surgery that was recommended at the [facility] with Dr. Renee.  She was here today for second opinion.  She and her niece were happy for the second opinion today.  Also discussed that the protrusio nature of her cup presents challenges, but she is in good hands with a hip revisionist specialist.    Return if symptoms worsen or fail to improve.      George Bianchi MD  06/16/25  16:28 EDT      Dictated Utilizing Dragon Dictation

## 2025-06-17 ENCOUNTER — TRANSCRIBE ORDERS (OUTPATIENT)
Dept: ADMINISTRATIVE | Facility: HOSPITAL | Age: 70
End: 2025-06-17
Payer: MEDICARE

## 2025-06-17 ENCOUNTER — ANTICOAGULATION VISIT (OUTPATIENT)
Dept: PHARMACY | Facility: HOSPITAL | Age: 70
End: 2025-06-17
Payer: MEDICARE

## 2025-06-17 DIAGNOSIS — Z12.31 ENCOUNTER FOR SCREENING MAMMOGRAM FOR MALIGNANT NEOPLASM OF BREAST: Primary | ICD-10-CM

## 2025-06-17 DIAGNOSIS — Z79.01 CHRONIC ANTICOAGULATION: Primary | ICD-10-CM

## 2025-06-17 DIAGNOSIS — I05.9 MITRAL VALVE DISEASE: ICD-10-CM

## 2025-06-17 LAB — INR PPP: 2.1

## 2025-06-17 NOTE — PROGRESS NOTES
"[facility] Anticoagulation Clinic Progress Note     Patient Demographics    Method of INR reporting: JOY HOME MONITOR  Estimated OOP Cost:    Indication:  Mechanical MVR  Referring Provider Ernst Mike MD  Reason patient is not on a DOAC: Unknown   Goal INR:  2-2.5  Warfarin Start Date ~ 2013  Reason patient is not on home monitor: N/A   [id number]: N/A  Planned Duration of Therapy Indefinite   Relevant medical history:    Bleed Risk/History: No history of bleed  Tablets Strength: 5 mg (peach) and 3 mg (brown)        Anticoagulation Clinic INR History    Date 2/11 2/19 2/25 3/4 3/19 3/25 4/1 4/8 4/15 4/22 4/29 5/7 5/13 5/20 5/29 6/3   Total Weekly Dose 22.5 mg 27.5 mg 25 mg 25 mg 25 mg  25 mg 25 mg 25 mg 25 mg 25 mg  25 mg  25 mg  25 mg  22.5 mg 22.5 mg 22.5 mg   INR 2.0 3.5 2.0 2.8 2.3 2.2 2.7 2.5 2.8 2.4 2.9 2.6 3.1 2.2 2.2 1.5   Notes Rec'd 2/12  redx1    Rec'd 4/2 Rec'd 4/9  Rec'd 4/23  Spoke 5/8  Rec'd 5/21       Date  6/10 6/17              Total Weekly  Dose 25 mg 22.5 mg              INR 2.7 2.1              Notes                  Patient Contact Information  Verbal release: Not obtained, mailing out along with [employer] and [employer] 5.22.25    Preferred contact number: [phone]  Alternative contact number(s): [phone]  Patient Appropriate for \"WarfNoCall\"? No   Preferred contact name: Rivka Marquez  Alternative contact name(s): [facility] (relative)      Preferred contact relation: Self  Lab contact information (if applicable):           Subjective Findings    Drug Interactions  Dietary Findings    Historical: Aspirin, duloxetine, trazodone, raloxifene, bumetanide, turmeric  Historical GLV intake (date updated): no more than 2 servings of GLV weekly    New (including OTC): None  GLV changes this encounter: No GLV     Alcohol and Tobacco   Historical: Vapes 5 cartridges weekly    New: None       Patient Findings:  Positives: Upcoming invasive procedure, Change in diet/appetite "   Negatives: Signs/symptoms of thrombosis, Signs/symptoms of bleeding, Laboratory test error suspected, Change in health, Change in alcohol use, Change in activity, Emergency department visit, Upcoming dental procedure, Missed doses, Extra doses, Change in medications, Hospital admission, Bruising, Other complaints   Comments: Patient reports no GLV this past week. Patient expects a corrective surgery for her hip replacement upcoming- not scheduled yet. All other findings negative per patient.       Assessment and Plan:    INR therapeutic at 2.1 (goal 2.0-2.5). Instructed patient to continue warfarin 2.5 mg daily except 5 mg Mon/Thurs until recheck.    Recheck INR 6/24/25  Verbal and written information provided. Rivka Marquez expresses understanding by teach back and has no further questions at this time.    Kristopher Lacey   [facility]  6/17/2025 15:24 EDT      I, Jacquelyn Lau, PharmD, have reviewed the note in full and agree with the assessment and plan.  06/17/25  15:32 EDT

## 2025-06-24 LAB — INR PPP: 2.2

## 2025-06-25 ENCOUNTER — ANTICOAGULATION VISIT (OUTPATIENT)
Dept: PHARMACY | Facility: HOSPITAL | Age: 70
End: 2025-06-25
Payer: MEDICARE

## 2025-06-25 DIAGNOSIS — Z79.01 CHRONIC ANTICOAGULATION: Primary | ICD-10-CM

## 2025-06-25 DIAGNOSIS — I05.9 MITRAL VALVE DISEASE: ICD-10-CM

## 2025-06-25 NOTE — PROGRESS NOTES
"[facility] Anticoagulation Clinic Progress Note     Patient Demographics    Method of INR reporting: JOY HOME MONITOR  Estimated OOP Cost:    Indication:  Mechanical MVR  Referring Provider Ernst Mike MD  Reason patient is not on a DOAC: Unknown   Goal INR:  2-2.5  Warfarin Start Date ~ 2013  Reason patient is not on home monitor: N/A   [id number]: N/A  Planned Duration of Therapy Indefinite   Relevant medical history:    Bleed Risk/History: No history of bleed  Tablets Strength: 5 mg (peach) and 3 mg (brown)        Anticoagulation Clinic INR History    Date 2/11 2/19 2/25 3/4 3/19 3/25 4/1 4/8 4/15 4/22 4/29 5/7 5/13 5/20 5/29 6/3   Total Weekly Dose 22.5 mg 27.5 mg 25 mg 25 mg 25 mg  25 mg 25 mg 25 mg 25 mg 25 mg  25 mg  25 mg  25 mg  22.5 mg 22.5 mg 22.5 mg   INR 2.0 3.5 2.0 2.8 2.3 2.2 2.7 2.5 2.8 2.4 2.9 2.6 3.1 2.2 2.2 1.5   Notes Rec'd 2/12  redx1    Rec'd 4/2 Rec'd 4/9  Rec'd 4/23  Spoke 5/8  Rec'd 5/21       Date  6/10 6/17 6/24             Total Weekly  Dose 25 mg 22.5 mg 22.5 mg             INR 2.7 2.1 2.2             Notes   Rec'd 6/25               Patient Contact Information  Verbal release: signed 6/12/25    Preferred contact number: [phone]  Alternative contact number(s): [phone] ([facility], [facility])  Patient Appropriate for \"WarfNoCall\"? No   Preferred contact name: Rivka Marquez  Alternative contact name(s): Richie Marquez (nephew), Mak Marquez (brother), Ana Mehta (sister)      Preferred contact relation: Self  Lab contact information (if applicable):           Subjective Findings    Drug Interactions  Dietary Findings    Historical: Aspirin, duloxetine, trazodone, raloxifene, bumetanide, turmeric  Historical GLV intake (date updated): no more than 2 servings of GLV weekly    New (including OTC): None  GLV changes this encounter: No GLV     Alcohol and Tobacco   Historical: Vapes 5 cartridges weekly    New: None       Patient Findings    Negatives: " Signs/symptoms of thrombosis, Signs/symptoms of bleeding, Laboratory test error suspected, Change in health, Change in alcohol use, Change in activity, Upcoming invasive procedure, Emergency department visit, Upcoming dental procedure, Missed doses, Extra doses, Change in medications, Change in diet/appetite, Hospital admission, Bruising, Other complaints   Comments: All findings negative per patient         Assessment and Plan:    INR therapeutic 6/24 at 2.2 (goal 2.0-2.5). Instructed patient to continue warfarin 2.5 mg daily except 5 mg Mon/Thurs until recheck.    Recheck INR 07/01/25.  Verbal and written information provided. Rivka Marquez expresses understanding by teach back and has no further questions at this time.      Shilpi Pablo, PharmD  6/25/2025  10:30 EDT

## 2025-07-01 ENCOUNTER — ANTICOAGULATION VISIT (OUTPATIENT)
Dept: PHARMACY | Facility: HOSPITAL | Age: 70
End: 2025-07-01
Payer: MEDICARE

## 2025-07-01 DIAGNOSIS — I05.9 MITRAL VALVE DISEASE: ICD-10-CM

## 2025-07-01 DIAGNOSIS — Z79.01 CHRONIC ANTICOAGULATION: Primary | ICD-10-CM

## 2025-07-01 LAB — INR PPP: 2.6

## 2025-07-01 NOTE — PROGRESS NOTES
Subjective     Chief Complaint   Patient presents with    Facial Pain     Left side of face.   Pn travels to left jaw and behind ear  Lasting 5 days    Headache    overactive bladder     Would like an increase to her medication        History of Present Illness    Patient is needing a refill on detrol LA.  Patient gets up to urinate 1-3 times a night.  Bladder leakage with jumping.  Previously did pelvic floor PT and it did help, completed it about a year ago.      Left facial pain and nasal dryness for about 5 days.  It is gradually getting worse.  Pain is radiating to left ear.  Left side of face is tender to touch and feels swollen.      Patient's PMR from outside medical facility reviewed and noted.    Review of Systems   HENT:  Positive for congestion, ear pain and sinus pressure.    Genitourinary:  Positive for enuresis. Negative for frequency and urgency.      Otherwise complete ROS reviewed and negative except as mentioned in the HPI.    Past Medical History:   Past Medical History:   Diagnosis Date    Allergic rhinitis     grass, low eggs, cats    COVID-19 12/2021    mild    Disc disease, degenerative, cervical     2 herniated discs    Lactose intolerance     Obesity     Overactive bladder      Past Surgical History:  Past Surgical History:   Procedure Laterality Date    ENDOSCOPY N/A 2/6/2023    Procedure: ESOPHAGOGASTRODUODENOSCOPY WITH ANESTHESIA;  Surgeon: Laura Holloway MD;  Location: [facility] ENDOSCOPY;  Service: Gastroenterology;  Laterality: N/A;  pre: epigastric pain. dysphagia.  post: normal  no PCP    REFRACTIVE SURGERY  04/2022    TONSILLECTOMY  2012     Social History:  reports that she has never smoked. She has been exposed to tobacco smoke. She has never used smokeless tobacco. She reports current alcohol use. She reports that she does not currently use drugs after having used the following drugs: Marijuana.    Family History: family history includes Alzheimer's disease in her maternal  "[facility] Anticoagulation Clinic Progress Note     Patient Demographics    Method of INR reporting: JOY HOME MONITOR  Estimated OOP Cost:    Indication:  Mechanical MVR  Referring Provider Ernst Mike MD  Reason patient is not on a DOAC: Unknown   Goal INR:  2-2.5  Warfarin Start Date ~ 2013  Reason patient is not on home monitor: N/A   [id number]: N/A  Planned Duration of Therapy Indefinite   Relevant medical history:    Bleed Risk/History: No history of bleed  Tablets Strength: 5 mg (peach) and 3 mg (brown)        Anticoagulation Clinic INR History    Date 2/11 2/19 2/25 3/4 3/19 3/25 4/1 4/8 4/15 4/22 4/29 5/7 5/13 5/20 5/29 6/3   Total Weekly Dose 22.5 mg 27.5 mg 25 mg 25 mg 25 mg  25 mg 25 mg 25 mg 25 mg 25 mg  25 mg  25 mg  25 mg  22.5 mg 22.5 mg 22.5 mg   INR 2.0 3.5 2.0 2.8 2.3 2.2 2.7 2.5 2.8 2.4 2.9 2.6 3.1 2.2 2.2 1.5   Notes Rec'd 2/12  redx1    Rec'd 4/2 Rec'd 4/9  Rec'd 4/23  Spoke 5/8  Rec'd 5/21       Date  6/10 6/17 6/24 7/1            Total Weekly  Dose 25 mg 22.5 mg 22.5 mg 22.5 mg            INR 2.7 2.1 2.2 2.6            Notes   Rec'd 6/25               Patient Contact Information  Verbal release: signed 6/12/25    Preferred contact number: [phone]  Alternative contact number(s): [phone] ([facility], [facility])  Patient Appropriate for \"WarfNoCall\"? No   Preferred contact name: Rivka Marquez  Alternative contact name(s): Richie Marquez (nephew), Mak Marquez (brother), Ana Mehta (sister)      Preferred contact relation: Self  Lab contact information (if applicable):           Subjective Findings    Drug Interactions  Dietary Findings    Historical: Aspirin, duloxetine, trazodone, raloxifene, bumetanide, turmeric  Historical GLV intake (date updated): no more than 2 servings of GLV weekly    New (including OTC): None  GLV changes this encounter: No GLV     Alcohol and Tobacco   Historical: Vapes 5 cartridges weekly    New: None       Patient Findings:  Negatives: " "grandfather; Bipolar disorder in her father and sister; Heart failure in her mother; Hypertension in her maternal grandfather, maternal grandmother, and mother; No Known Problems in her half-brother; Obesity in her mother.      Allergies:  Allergies   Allergen Reactions    Contrast Dye (Echo Or Unknown Ct/Mr) Anaphylaxis     Medications:  Prior to Admission medications    Medication Sig Start Date End Date Taking? Authorizing Provider   cholecalciferol (VITAMIN D3) 250 MCG (24974 UT) capsule Take 1 capsule by mouth 1 (One) Time Per Week. 12/12/22 12/12/23 Yes Isabel Potts APRN   Echinacea 650 MG capsule Take 650 mg by mouth Daily.   Yes Lynette Garcia MD   Homeopathic Products (ZICAM COLD REMEDY PO) Take  by mouth As Needed.   Yes Lynette Garcia MD   ibuprofen (ADVIL,MOTRIN) 200 MG tablet Take 3 tablets by mouth As Needed. For chronic neck pain - herniated discs - several times a week for 15 years - trying to use less frequently   Yes Lynette Garcia MD   Loratadine (CLARITIN PO) Take  by mouth Daily. Alternates with generic zyrtec about every 6 months   Yes Lynette Garcia MD   omeprazole (priLOSEC) 40 MG capsule TAKE 1 CAPSULE BY MOUTH EVERY DAY 2/13/23  Yes Isabel Potts APRN   tolterodine LA (DETROL LA) 4 MG 24 hr capsule Take 1 capsule by mouth Daily. 6/1/22  Yes Lynette Garcia MD   Turmeric 500 MG capsule Take 1 capsule by mouth Daily. 6/1/22  Yes Lynette Garcia MD       Objective     Vital Signs: /80   Pulse 66   Temp 98.6 øF (37 øC)   Ht 165.1 cm (65\")   Wt 107 kg (235 lb)   SpO2 98%   BMI 39.11 kg/mý   Physical Exam  Vitals and nursing note reviewed.   Constitutional:       Appearance: She is obese.   HENT:      Head: Normocephalic.      Right Ear: Tympanic membrane normal.      Left Ear: Tympanic membrane normal.      Nose: Nose normal. Congestion present.      Left Sinus: Maxillary sinus tenderness present.      Mouth/Throat:     " Signs/symptoms of thrombosis, Signs/symptoms of bleeding, Laboratory test error suspected, Change in health, Change in alcohol use, Change in activity, Upcoming invasive procedure, Emergency department visit, Upcoming dental procedure, Missed doses, Extra doses, Change in medications, Change in diet/appetite, Hospital admission, Bruising, Other complaints   Comments: All findings negative per patient       Assessment and Plan:    INR slightly supra therapeutic 2.6 (goal 2.0-2.5). Instructed patient to continue warfarin 2.5 mg daily except 5 mg Mon/Thurs until recheck.    Recheck INR 7/8/25.  Verbal and written information provided. Rivka Marquez expresses understanding by teach back and has no further questions at this time.      Jacquelyn Lau, PharmD   7/1/2025  11:59 EDT        Mouth: Mucous membranes are moist.   Eyes:      Conjunctiva/sclera: Conjunctivae normal.   Cardiovascular:      Rate and Rhythm: Normal rate and regular rhythm.      Pulses: Normal pulses.      Heart sounds: Normal heart sounds.   Pulmonary:      Effort: Pulmonary effort is normal.      Breath sounds: Normal breath sounds.   Musculoskeletal:         General: Normal range of motion.      Cervical back: Normal range of motion.   Skin:     General: Skin is warm and dry.   Neurological:      General: No focal deficit present.      Mental Status: She is alert and oriented to person, place, and time.   Psychiatric:         Mood and Affect: Mood normal.         Behavior: Behavior normal.       Class 2 Severe Obesity (BMI >=35 and <=39.9). Obesity-related health conditions include the following: none. Obesity is worsening. BMI is is above average; BMI management plan is completed. We discussed low calorie, low carb based diet program, portion control, and increasing exercise.        Results Reviewed:  Glucose   Date Value Ref Range Status   11/17/2022 97 70 - 99 mg/dL Final     BUN   Date Value Ref Range Status   11/17/2022 14 6 - 20 mg/dL Final     Creatinine   Date Value Ref Range Status   11/17/2022 0.73 0.57 - 1.00 mg/dL Final     Sodium   Date Value Ref Range Status   11/17/2022 139 134 - 144 mmol/L Final     Potassium   Date Value Ref Range Status   11/17/2022 4.1 3.5 - 5.2 mmol/L Final     Chloride   Date Value Ref Range Status   11/17/2022 102 96 - 106 mmol/L Final     Total CO2   Date Value Ref Range Status   11/17/2022 23 20 - 29 mmol/L Final     Calcium   Date Value Ref Range Status   11/17/2022 9.3 8.7 - 10.2 mg/dL Final     ALT (SGPT)   Date Value Ref Range Status   11/17/2022 10 0 - 32 IU/L Final     AST (SGOT)   Date Value Ref Range Status   11/17/2022 16 0 - 40 IU/L Final     WBC   Date Value Ref Range Status   11/17/2022 6.3 3.4 - 10.8 x10E3/uL Final     Hematocrit   Date Value Ref Range Status    11/17/2022 41.4 34.0 - 46.6 % Final     Platelets   Date Value Ref Range Status   11/17/2022 241 150 - 450 x10E3/uL Final         Assessment / Plan     Assessment/Plan     Diagnoses and all orders for this visit:    1. Overactive bladder (Primary)  -     oxybutynin XL (Ditropan XL) 5 MG 24 hr tablet; Take 1 tablet by mouth Daily.  Dispense: 30 tablet; Refill: 5  -     tolterodine LA (DETROL LA) 4 MG 24 hr capsule; Take 1 capsule by mouth Daily.  Dispense: 30 capsule; Refill: 5    2. Acute recurrent maxillary sinusitis  -     amoxicillin-clavulanate (AUGMENTIN) 875-125 MG per tablet; Take 1 tablet by mouth 2 (Two) Times a Day for 7 days.  Dispense: 14 tablet; Refill: 0    3. Class 2 obesity due to excess calories without serious comorbidity in adult, unspecified BMI         An After Visit Summary was printed and given to the patient at discharge.  Return in about 3 months (around 10/31/2023) for Recheck bladder.    I have discussed the patient results/orders and plan/recommendation with them at today's visit.      Isabel Potts, APRN   07/31/2023

## 2025-07-08 LAB — INR PPP: 2.3

## 2025-07-09 ENCOUNTER — ANTICOAGULATION VISIT (OUTPATIENT)
Dept: PHARMACY | Facility: HOSPITAL | Age: 70
End: 2025-07-09
Payer: MEDICARE

## 2025-07-09 DIAGNOSIS — Z79.01 CHRONIC ANTICOAGULATION: Primary | ICD-10-CM

## 2025-07-09 DIAGNOSIS — I05.9 MITRAL VALVE DISEASE: ICD-10-CM

## 2025-07-09 LAB
MDC_IDC_MSMT_BATTERY_REMAINING_LONGEVITY: 8 MO
MDC_IDC_MSMT_BATTERY_REMAINING_PERCENTAGE: 7 %
MDC_IDC_MSMT_BATTERY_RRT_TRIGGER: 2.6
MDC_IDC_MSMT_BATTERY_STATUS: NORMAL
MDC_IDC_MSMT_BATTERY_VOLTAGE: 2.71
MDC_IDC_MSMT_LEADCHNL_RA_IMPEDANCE_VALUE: 300
MDC_IDC_MSMT_LEADCHNL_RA_PACING_THRESHOLD_POLARITY: NORMAL
MDC_IDC_MSMT_LEADCHNL_RA_SENSING_INTR_AMPL: 0.7
MDC_IDC_MSMT_LEADCHNL_RV_DTM: NORMAL
MDC_IDC_MSMT_LEADCHNL_RV_IMPEDANCE_VALUE: 590
MDC_IDC_MSMT_LEADCHNL_RV_PACING_THRESHOLD_AMPLITUDE: 0.5
MDC_IDC_MSMT_LEADCHNL_RV_PACING_THRESHOLD_POLARITY: NORMAL
MDC_IDC_MSMT_LEADCHNL_RV_PACING_THRESHOLD_PULSEWIDTH: 0.4
MDC_IDC_MSMT_LEADCHNL_RV_SENSING_INTR_AMPL: 12
MDC_IDC_PG_IMPLANT_DTM: NORMAL
MDC_IDC_PG_MFG: NORMAL
MDC_IDC_PG_MODEL: NORMAL
MDC_IDC_PG_SERIAL: NORMAL
MDC_IDC_PG_TYPE: NORMAL
MDC_IDC_SESS_DTM: NORMAL
MDC_IDC_SESS_TYPE: NORMAL
MDC_IDC_SET_BRADY_AT_MODE_SWITCH_RATE: 180
MDC_IDC_SET_BRADY_LOWRATE: 60
MDC_IDC_SET_BRADY_MAX_SENSOR_RATE: 130
MDC_IDC_SET_BRADY_MAX_TRACKING_RATE: 115
MDC_IDC_SET_BRADY_MODE: NORMAL
MDC_IDC_SET_BRADY_PAV_DELAY: 180
MDC_IDC_SET_BRADY_SAV_DELAY: 150
MDC_IDC_SET_LEADCHNL_RA_PACING_AMPLITUDE: 2.5
MDC_IDC_SET_LEADCHNL_RA_PACING_POLARITY: NORMAL
MDC_IDC_SET_LEADCHNL_RA_PACING_PULSEWIDTH: 0.4
MDC_IDC_SET_LEADCHNL_RA_SENSING_POLARITY: NORMAL
MDC_IDC_SET_LEADCHNL_RA_SENSING_SENSITIVITY: 0.4
MDC_IDC_SET_LEADCHNL_RV_PACING_AMPLITUDE: 0.75
MDC_IDC_SET_LEADCHNL_RV_PACING_POLARITY: NORMAL
MDC_IDC_SET_LEADCHNL_RV_PACING_PULSEWIDTH: 0.4
MDC_IDC_SET_LEADCHNL_RV_SENSING_POLARITY: NORMAL
MDC_IDC_SET_LEADCHNL_RV_SENSING_SENSITIVITY: 2
MDC_IDC_STAT_AT_BURDEN_PERCENT: 0
MDC_IDC_STAT_BRADY_RA_PERCENT_PACED: 71
MDC_IDC_STAT_BRADY_RV_PERCENT_PACED: 96

## 2025-07-09 NOTE — PROGRESS NOTES
"[facility] Anticoagulation Clinic Progress Note      Patient Demographics     Method of INR reporting: JOY HOME MONITOR  Estimated OOP Cost:     Indication:  Mechanical MVR   Referring Provider Ernst Mike MD   Reason patient is not on a DOAC: Unknown   Goal INR:  2-2.5   Warfarin Start Date ~ 2013   Reason patient is not on home monitor: N/A   [id number]: N/A   Planned Duration of Therapy Indefinite    Relevant medical history:     Bleed Risk/History: No history of bleed   Tablets Strength: 5 mg (peach) and 3 mg (brown)          Anticoagulation Clinic INR History     Date 2/11 2/19 2/25 3/4 3/19 3/25 4/1 4/8 4/15 4/22 4/29 5/7 5/13 5/20 5/29 6/3   Total Weekly Dose 22.5 mg 27.5 mg 25 mg 25 mg 25 mg  25 mg 25 mg 25 mg 25 mg 25 mg  25 mg  25 mg  25 mg  22.5 mg 22.5 mg 22.5 mg   INR 2.0 3.5 2.0 2.8 2.3 2.2 2.7 2.5 2.8 2.4 2.9 2.6 3.1 2.2 2.2 1.5   Notes Rec'd 2/12   redx1       Rec'd 4/2 Rec'd 4/9   Rec'd 4/23   Spoke 5/8   Rec'd 5/21          Date  6/10 6/17 6/24 7/1 7/8                   Total Weekly  Dose 25 mg 22.5 mg 22.5 mg 22.5 mg 22.5 mg                   INR 2.7 2.1 2.2 2.6 2.3                   Notes     Rec'd 6/25   Rec'd 7/9                      Patient Contact Information  Verbal release: signed 6/12/25     Preferred contact number: [phone]   Alternative contact number(s): [phone] (Glory Green, niece)   Patient Appropriate for \"WarfNoCall\"? No   Preferred contact name: Rivka Marquez   Alternative contact name(s): Richie Marquez (nephew), Mak Marquez (brother), Ana Mehta (sister)         Preferred contact relation: Self   Lab contact information (if applicable):                 Subjective Findings            Drug Interactions   Dietary Findings     Historical: Aspirin, duloxetine, trazodone, raloxifene, bumetanide, turmeric   Historical GLV intake (date updated): no more than 2 servings of GLV weekly    New (including OTC): None   GLV changes this encounter: None        "   Alcohol and Tobacco   Historical: Vapes 5 cartridges weekly    New: None        Patient Findings:  Negatives: Signs/symptoms of thrombosis, Signs/symptoms of bleeding, Laboratory test error suspected, Change in health, Change in alcohol use, Change in activity, Upcoming invasive procedure, Emergency department visit, Upcoming dental procedure, Missed doses, Extra doses, Change in medications, Change in diet/appetite, Hospital admission, Bruising, Other complaints   Comments: All findings negative per patient.       Assessment and Plan:  INR was therapeutic yesterday at 2.3 (goal 2.0-2.5). Instructed patient to continue warfarin 2.5 mg daily except 5 mg Mon/Thurs until recheck.    Recheck INR in 1 week, 7.15.25  Verbal and written information provided. Rivka Marquez expresses understanding by teach back and has no further questions at this time.      Jessica Flanagan CPhT, [facility]   10:06 EDT   7/9/2025    IJacquelyn, PharmD, have reviewed the note in full and agree with the assessment and plan.  07/09/25  10:31 EDT

## 2025-07-09 NOTE — PROGRESS NOTES
"[facility] Anticoagulation Clinic Progress Note     Patient Demographics    Method of INR reporting: JOY HOME MONITOR  Estimated OOP Cost:    Indication:  Mechanical MVR  Referring Provider Ernst Mike MD  Reason patient is not on a DOAC: Unknown   Goal INR:  2-2.5  Warfarin Start Date ~ 2013  Reason patient is not on home monitor: N/A   [id number]: N/A  Planned Duration of Therapy Indefinite   Relevant medical history:    Bleed Risk/History: No history of bleed  Tablets Strength: 5 mg (peach) and 3 mg (brown)        Anticoagulation Clinic INR History    Date 2/11 2/19 2/25 3/4 3/19 3/25 4/1 4/8 4/15 4/22 4/29 5/7 5/13 5/20 5/29 6/3   Total Weekly Dose 22.5 mg 27.5 mg 25 mg 25 mg 25 mg  25 mg 25 mg 25 mg 25 mg 25 mg  25 mg  25 mg  25 mg  22.5 mg 22.5 mg 22.5 mg   INR 2.0 3.5 2.0 2.8 2.3 2.2 2.7 2.5 2.8 2.4 2.9 2.6 3.1 2.2 2.2 1.5   Notes Rec'd 2/12  redx1    Rec'd 4/2 Rec'd 4/9  Rec'd 4/23  Spoke 5/8  Rec'd 5/21       Date  6/10 6/17 6/24 7/1 7/8           Total Weekly  Dose 25 mg 22.5 mg 22.5 mg 22.5 mg 22.5 mg           INR 2.7 2.1 2.2 2.6 2.3           Notes   Rec'd 6/25               Patient Contact Information  Verbal release: signed 6/12/25    Preferred contact number: [phone]  Alternative contact number(s): [phone] (Glory Green, niece)  Patient Appropriate for \"WarfNoCall\"? No   Preferred contact name: Rivka Marquez  Alternative contact name(s): Richie Marquez (nephew), Mak Marquez (brother), Ana Mehta (sister)      Preferred contact relation: Self  Lab contact information (if applicable):           Subjective Findings    Drug Interactions  Dietary Findings    Historical: Aspirin, duloxetine, trazodone, raloxifene, bumetanide, turmeric  Historical GLV intake (date updated): no more than 2 servings of GLV weekly    New (including OTC): None  GLV changes this encounter: No GLV     Alcohol and Tobacco   Historical: Vapes 5 cartridges weekly    New: None       Patient " Findings:  Negatives: Signs/symptoms of thrombosis, Signs/symptoms of bleeding, Laboratory test error suspected, Change in health, Change in alcohol use, Change in activity, Upcoming invasive procedure, Emergency department visit, Upcoming dental procedure, Missed doses, Extra doses, Change in medications, Change in diet/appetite, Hospital admission, Bruising, Other complaints   Comments: All findings negative per patient       Assessment and Plan:  UNABLE TO GET IN CONTACT WITH THE PATIENT. PLEASE DISREGARD THE FOLLOWING PLAN UNTIL ABLE TO GET IN CONTACT WITH PATIENT/ PATIENT REPRESENTATIVE.  LVM 7/9 0820    INR therapeutic 2.3 (goal 2.0-2.5). Instructed patient to continue warfarin 2.5 mg daily except 5 mg Mon/Thurs until recheck.    Recheck INR 7/8/25.  Verbal and written information provided. Rivka Marquez expresses understanding by teach back and has no further questions at this time.    Lon Toussaint, PharmD, BCPS  7/9/2025  08:18 EDT

## 2025-07-11 RX ORDER — WARFARIN SODIUM 5 MG/1
TABLET ORAL
Qty: 45 TABLET | Refills: 2 | OUTPATIENT
Start: 2025-07-11

## 2025-07-11 RX ORDER — WARFARIN SODIUM 5 MG/1
TABLET ORAL
Qty: 90 TABLET | Refills: 0 | Status: SHIPPED | OUTPATIENT
Start: 2025-07-11

## 2025-07-14 ENCOUNTER — ANTICOAGULATION VISIT (OUTPATIENT)
Dept: PHARMACY | Facility: HOSPITAL | Age: 70
End: 2025-07-14
Payer: MEDICARE

## 2025-07-14 DIAGNOSIS — Z79.01 CHRONIC ANTICOAGULATION: Primary | ICD-10-CM

## 2025-07-14 DIAGNOSIS — I05.9 MITRAL VALVE DISEASE: ICD-10-CM

## 2025-07-14 LAB — INR PPP: 1.9

## 2025-07-14 NOTE — PROGRESS NOTES
"[facility] Anticoagulation Clinic Progress Note      Patient Demographics     Method of INR reporting: MDINJENIFFER HOME MONITOR  Estimated OOP Cost:     Indication:  Mechanical MVR   Referring Provider Ernst Mike MD   Reason patient is not on a DOAC: Unknown   Goal INR:  2-2.5   Warfarin Start Date ~ 2013   Reason patient is not on home monitor: N/A   [id number]: N/A   Planned Duration of Therapy Indefinite    Relevant medical history:     Bleed Risk/History: No history of bleed   Tablets Strength: 5 mg (peach) and 3 mg (brown)          Anticoagulation Clinic INR History     Date 2/11 2/19 2/25 3/4 3/19 3/25 4/1 4/8 4/15 4/22 4/29 5/7 5/13 5/20 5/29 6/3   Total Weekly Dose 22.5 mg 27.5 mg 25 mg 25 mg 25 mg  25 mg 25 mg 25 mg 25 mg 25 mg  25 mg  25 mg  25 mg  22.5 mg 22.5 mg 22.5 mg   INR 2.0 3.5 2.0 2.8 2.3 2.2 2.7 2.5 2.8 2.4 2.9 2.6 3.1 2.2 2.2 1.5   Notes Rec'd 2/12   redx1       Rec'd 4/2 Rec'd 4/9   Rec'd 4/23   Spoke 5/8   Rec'd 5/21          Date  6/10 6/17 6/24 7/1 7/8 7/14                 Total Weekly  Dose 25 mg 22.5 mg 22.5 mg 22.5 mg 22.5 mg 22.5 mg                 INR 2.7 2.1 2.2 2.6 2.3 1.9                 Notes     Rec'd 6/25   Rec'd 7/9                      Patient Contact Information  Verbal release: signed 6/12/25     Preferred contact number: [phone]   Alternative contact number(s): [phone] (Glory Peter, nifawn)   Patient Appropriate for \"WarfNoCall\"? No   Preferred contact name: Rivka Marquez   Alternative contact name(s): Richie Marquez (nephew), Mak Marquez (brother), Ana Mehta (sister)         Preferred contact relation: Self   Lab contact information (if applicable):                 Subjective Findings            Drug Interactions   Dietary Findings     Historical: Aspirin, duloxetine, trazodone, raloxifene, bumetanide, turmeric   Historical GLV intake (date updated): no more than 2 servings of GLV weekly    New (including OTC): None   GLV changes this encounter: " Spinach Dip           Alcohol and Tobacco   Historical: Vapes 5 cartridges weekly    New: None       Patient Findings    Positives: Change in diet/appetite   Negatives: Signs/symptoms of thrombosis, Signs/symptoms of bleeding, Laboratory test error suspected, Change in health, Change in alcohol use, Change in activity, Upcoming invasive procedure, Emergency department visit, Upcoming dental procedure, Missed doses, Extra doses, Change in medications, Hospital admission, Bruising, Other complaints   Comments: Patient got days mixed up. She also reports consuming spinach dip throughout the week. All other findings negative per patient.       Assessment and Plan:    INR was subtherapeutic yesterday at 1.9 (goal 2.0-2.5). Patient took 2.5 mg yesterday because she got the days mixed up. Instructed patient to take 5 mg today, then continue warfarin 2.5 mg daily except 5 mg Mon/Thurs until recheck.    Recheck INR 7.22.25  Verbal and written information provided. Rivka Marquez expresses understanding by teach back and has no further questions at this time.      Jessica Flanagan CPhT, [facility]   09:41 EDT   7/15/2025    Shilpi SKINNER, PharmD, have reviewed the note in full and agree with the assessment and plan.  07/15/25  11:18 EDT

## 2025-07-22 ENCOUNTER — ANTICOAGULATION VISIT (OUTPATIENT)
Dept: PHARMACY | Facility: HOSPITAL | Age: 70
End: 2025-07-22
Payer: MEDICARE

## 2025-07-22 DIAGNOSIS — Z79.01 CHRONIC ANTICOAGULATION: Primary | ICD-10-CM

## 2025-07-22 DIAGNOSIS — I05.9 MITRAL VALVE DISEASE: ICD-10-CM

## 2025-07-22 LAB — INR PPP: 2.1

## 2025-07-22 NOTE — PROGRESS NOTES
"[facility] Anticoagulation Clinic Progress Note      Patient Demographics     Method of INR reporting: MDINJENIFFER HOME MONITOR  Estimated OOP Cost:     Indication:  Mechanical MVR   Referring Provider Ernst Mike MD   Reason patient is not on a DOAC: Unknown   Goal INR:  2-2.5   Warfarin Start Date ~ 2013   Reason patient is not on home monitor: N/A   [id number]: N/A   Planned Duration of Therapy Indefinite    Relevant medical history:     Bleed Risk/History: No history of bleed   Tablets Strength: 5 mg (peach) and 3 mg (brown)          Anticoagulation Clinic INR History     Date 2/11 2/19 2/25 3/4 3/19 3/25 4/1 4/8 4/15 4/22 4/29 5/7 5/13 5/20 5/29 6/3   Total Weekly Dose 22.5 mg 27.5 mg 25 mg 25 mg 25 mg  25 mg 25 mg 25 mg 25 mg 25 mg  25 mg  25 mg  25 mg  22.5 mg 22.5 mg 22.5 mg   INR 2.0 3.5 2.0 2.8 2.3 2.2 2.7 2.5 2.8 2.4 2.9 2.6 3.1 2.2 2.2 1.5   Notes Rec'd 2/12   redx1       Rec'd 4/2 Rec'd 4/9   Rec'd 4/23   Spoke 5/8   Rec'd 5/21          Date  6/10 6/17 6/24 7/1 7/8 7/14  7/22               Total Weekly  Dose 25 mg 22.5 mg 22.5 mg 22.5 mg 22.5 mg 22.5 mg  25 mg               INR 2.7 2.1 2.2 2.6 2.3 1.9  2.1               Notes     Rec'd 6/25   Rec'd 7/9                      Patient Contact Information  Verbal release: signed 6/12/25     Preferred contact number: [phone]   Alternative contact number(s): [phone] (Glory Green, nifawn)   Patient Appropriate for \"WarfNoCall\"? No   Preferred contact name: Rivka Marquez   Alternative contact name(s): Richie Marquez (nephew), Mak Marquez (brother), Ana Mehta (sister)         Preferred contact relation: Self   Lab contact information (if applicable):                 Subjective Findings            Drug Interactions   Dietary Findings     Historical: Aspirin, duloxetine, trazodone, raloxifene, bumetanide, turmeric   Historical GLV intake (date updated): no more than 2 servings of GLV weekly    New (including OTC): None   GLV changes this " encounter: none          Alcohol and Tobacco   Historical: Vapes 5 cartridges weekly    New: None       Patient Findings    Negatives: Signs/symptoms of thrombosis, Signs/symptoms of bleeding, Laboratory test error suspected, Change in health, Change in alcohol use, Change in activity, Upcoming invasive procedure, Emergency department visit, Upcoming dental procedure, Missed doses, Extra doses, Change in medications, Change in diet/appetite, Hospital admission, Bruising, Other complaints   Comments: Patient states she is back to normal GLV intake. All findings negative per patient.         Assessment and Plan:    INR therapeutic at 2.1 (goal 2.0-2.5). Instructed patient to continue warfarin 2.5 mg daily except 5 mg Mon/Thurs until recheck.    Recheck INR 7.29.25  Verbal and written information provided. Rivkajaret Mendosa Jimmy expresses understanding by teach back and has no further questions at this time.    Kristopher Lacey   [facility]  7/22/2025 14:24 EDT    I, Shilpi Pablo, PharmD, have reviewed the note in full and agree with the assessment and plan.  07/22/25  14:32 EDT

## 2025-07-29 ENCOUNTER — ANTICOAGULATION VISIT (OUTPATIENT)
Dept: PHARMACY | Facility: HOSPITAL | Age: 70
End: 2025-07-29
Payer: MEDICARE

## 2025-07-29 DIAGNOSIS — Z79.01 CHRONIC ANTICOAGULATION: Primary | ICD-10-CM

## 2025-07-29 DIAGNOSIS — I05.9 MITRAL VALVE DISEASE: ICD-10-CM

## 2025-07-29 LAB — INR PPP: 2.1

## 2025-07-29 NOTE — PROGRESS NOTES
"[facility] Anticoagulation Clinic Progress Note      Patient Demographics     Method of INR reporting: JOY HOME MONITOR  Estimated OOP Cost:     Indication:  Mechanical MVR   Referring Provider Ernst Mike MD   Reason patient is not on a DOAC: Unknown   Goal INR:  2-2.5   Warfarin Start Date ~ 2013   Reason patient is not on home monitor: N/A   [id number]: N/A   Planned Duration of Therapy Indefinite    Relevant medical history:     Bleed Risk/History: No history of bleed   Tablets Strength: 5 mg (peach) and 3 mg (brown)          Anticoagulation Clinic INR History     Date 2/11 2/19 2/25 3/4 3/19 3/25 4/1 4/8 4/15 4/22 4/29 5/7 5/13 5/20 5/29 6/3   Total Weekly Dose 22.5 mg 27.5 mg 25 mg 25 mg 25 mg  25 mg 25 mg 25 mg 25 mg 25 mg  25 mg  25 mg  25 mg  22.5 mg 22.5 mg 22.5 mg   INR 2.0 3.5 2.0 2.8 2.3 2.2 2.7 2.5 2.8 2.4 2.9 2.6 3.1 2.2 2.2 1.5   Notes Rec'd 2/12   redx1       Rec'd 4/2 Rec'd 4/9   Rec'd 4/23   Spoke 5/8   Rec'd 5/21          Date  6/10 6/17 6/24 7/1 7/8 7/14  7/22  7/29             Total Weekly  Dose 25 mg 22.5 mg 22.5 mg 22.5 mg 22.5 mg 22.5 mg  25 mg  22.5 mg             INR 2.7 2.1 2.2 2.6 2.3 1.9  2.1  2.1             Notes     Rec'd 6/25   Rec'd 7/9                      Patient Contact Information  Verbal release: signed 6/12/25     Preferred contact number: [phone]   Alternative contact number(s): [phone] (Glory Green, nifawn)   Patient Appropriate for \"WarfNoCall\"? No   Preferred contact name: Rivka Marquez   Alternative contact name(s): Richie Marquez (nephew), Mak Marquez (brother), Ana Mehta (sister)         Preferred contact relation: Self   Lab contact information (if applicable):                 Subjective Findings            Drug Interactions   Dietary Findings     Historical: Aspirin, duloxetine, trazodone, raloxifene, bumetanide, turmeric   Historical GLV intake (date updated): no more than 2 servings of GLV weekly    New (including OTC): None   GLV " changes this encounter: none          Alcohol and Tobacco   Historical: Vapes 5 cartridges weekly    New: None         Patient Findings    Positives: Change in health, Other complaints   Negatives: Signs/symptoms of thrombosis, Signs/symptoms of bleeding, Laboratory test error suspected, Change in alcohol use, Change in activity, Upcoming invasive procedure, Emergency department visit, Upcoming dental procedure, Missed doses, Extra doses, Change in medications, Change in diet/appetite, Hospital admission, Bruising   Comments: Patient reports that provider had blood tested for signs of infection due to trouble when walking without use of aids such as walker/cane. Patient reports that tests for blood infections were negative. Patient would also like to clinic be aware that provider is scheduling a MRI to further test for reason for difficulty walking. Patient states that date is not set yet but will let clinic be aware. All other findings negative per patient.       Assessment and Plan:    INR therapeutic at 2.1 (goal 2.0-2.5). Instructed patient to continue warfarin 2.5 mg daily except 5 mg Mon/Thurs until recheck.    Recheck INR 8.5.25  Verbal and written information provided. Rivka Marquez expresses understanding by teach back and has no further questions at this time.    Skyler Mustafa CPhT 7/29/2025 13:15 EDT     I, Pedro Chappell, PharmD, have reviewed the note in full and agree with the assessment and plan.  07/29/25  13:24 EDT

## 2025-07-31 RX ORDER — METOPROLOL SUCCINATE 100 MG/1
100 TABLET, EXTENDED RELEASE ORAL DAILY
Qty: 90 TABLET | Refills: 1 | Status: SHIPPED | OUTPATIENT
Start: 2025-07-31

## 2025-07-31 RX ORDER — ATORVASTATIN CALCIUM 40 MG/1
20 TABLET, FILM COATED ORAL DAILY
Qty: 45 TABLET | Refills: 1 | Status: SHIPPED | OUTPATIENT
Start: 2025-07-31

## 2025-07-31 NOTE — TELEPHONE ENCOUNTER
Rx Refill Note  Requested Prescriptions     Pending Prescriptions Disp Refills    metoprolol succinate XL (TOPROL-XL) 100 MG 24 hr tablet [Pharmacy Med Name: METOPROLOL SUCCINATE  MG Oral Tablet Extended Release 24 Hour] 90 tablet 1     Sig: TAKE 1 TABLET EVERY DAY     Per last office note, patient was to continue taking as prescribed. Passed protocol, sent in rx.      Last office visit with prescribing clinician: 6/6/2025   Last telemedicine visit with prescribing clinician: Visit date not found   Next office visit with prescribing clinician: Visit date not found                        Would you like a call back once the refill request has been completed: [] Yes [] No [] N/A    If the office needs to give you a call back, can they leave a voicemail: [] Yes [] No [] N/A    Pharmacy Info    Last Fill Date:   Rx Written Date:   Prescribed Qty:   Additional Details from Pharmacy:       Diana Amado MA  07/31/25, 08:32 EDT

## 2025-08-05 ENCOUNTER — ANTICOAGULATION VISIT (OUTPATIENT)
Dept: PHARMACY | Facility: HOSPITAL | Age: 70
End: 2025-08-05
Payer: MEDICARE

## 2025-08-05 DIAGNOSIS — I05.9 MITRAL VALVE DISEASE: ICD-10-CM

## 2025-08-05 DIAGNOSIS — Z79.01 CHRONIC ANTICOAGULATION: Primary | ICD-10-CM

## 2025-08-05 LAB — INR PPP: 1.8

## 2025-08-06 ENCOUNTER — TELEPHONE (OUTPATIENT)
Dept: PHARMACY | Facility: HOSPITAL | Age: 70
End: 2025-08-06
Payer: MEDICARE

## 2025-08-12 ENCOUNTER — ANTICOAGULATION VISIT (OUTPATIENT)
Dept: PHARMACY | Facility: HOSPITAL | Age: 70
End: 2025-08-12
Payer: MEDICARE

## 2025-08-12 DIAGNOSIS — I05.9 MITRAL VALVE DISEASE: ICD-10-CM

## 2025-08-12 DIAGNOSIS — Z79.01 CHRONIC ANTICOAGULATION: Primary | ICD-10-CM

## 2025-08-12 LAB — INR PPP: 2.1

## 2025-08-19 ENCOUNTER — ANTICOAGULATION VISIT (OUTPATIENT)
Dept: PHARMACY | Facility: HOSPITAL | Age: 70
End: 2025-08-19
Payer: MEDICARE

## 2025-08-19 ENCOUNTER — TELEPHONE (OUTPATIENT)
Dept: ADMINISTRATIVE | Facility: OTHER | Age: 70
End: 2025-08-19
Payer: MEDICARE

## 2025-08-19 DIAGNOSIS — I05.9 MITRAL VALVE DISEASE: ICD-10-CM

## 2025-08-19 DIAGNOSIS — Z79.01 CHRONIC ANTICOAGULATION: Primary | ICD-10-CM

## 2025-08-19 LAB — INR PPP: 1.6

## 2025-08-26 ENCOUNTER — HOSPITAL ENCOUNTER (OUTPATIENT)
Dept: CT IMAGING | Facility: HOSPITAL | Age: 70
Discharge: HOME | End: 2025-08-26
Admitting: NURSE PRACTITIONER
Payer: MEDICARE

## 2025-08-26 ENCOUNTER — ANTICOAGULATION VISIT (OUTPATIENT)
Dept: PHARMACY | Facility: HOSPITAL | Age: 70
End: 2025-08-26
Payer: MEDICARE

## 2025-08-26 DIAGNOSIS — Z79.01 CHRONIC ANTICOAGULATION: Primary | ICD-10-CM

## 2025-08-26 DIAGNOSIS — Z72.0 TOBACCO ABUSE: ICD-10-CM

## 2025-08-26 DIAGNOSIS — I05.9 MITRAL VALVE DISEASE: ICD-10-CM

## 2025-08-26 LAB — INR PPP: 1.9

## 2025-08-26 PROCEDURE — 71250 CT THORAX DX C-: CPT
